# Patient Record
Sex: FEMALE | Race: WHITE | NOT HISPANIC OR LATINO | Employment: UNEMPLOYED
[De-identification: names, ages, dates, MRNs, and addresses within clinical notes are randomized per-mention and may not be internally consistent; named-entity substitution may affect disease eponyms.]

---

## 2017-01-03 ENCOUNTER — CHARTING TRANS (OUTPATIENT)
Dept: OTHER | Age: 62
End: 2017-01-03

## 2017-01-03 ENCOUNTER — IMAGING SERVICES (OUTPATIENT)
Dept: OTHER | Age: 62
End: 2017-01-03

## 2017-01-03 ENCOUNTER — HOSPITAL (OUTPATIENT)
Dept: OTHER | Age: 62
End: 2017-01-03
Attending: SURGERY

## 2017-01-30 ENCOUNTER — HOSPITAL ENCOUNTER (OUTPATIENT)
Dept: BONE DENSITY | Age: 62
Discharge: HOME | End: 2017-01-30
Attending: FAMILY MEDICINE
Payer: COMMERCIAL

## 2017-01-30 DIAGNOSIS — E03.9 ACQUIRED HYPOTHYROIDISM: ICD-10-CM

## 2017-01-30 DIAGNOSIS — Z78.0 MENOPAUSE: ICD-10-CM

## 2017-01-30 PROCEDURE — 77080 DXA BONE DENSITY AXIAL: CPT

## 2017-03-21 DIAGNOSIS — E03.9 ACQUIRED HYPOTHYROIDISM: Primary | ICD-10-CM

## 2017-03-21 RX ORDER — LEVOTHYROXINE SODIUM 137 UG/1
137 TABLET ORAL
Qty: 90 TABLET | Refills: 0 | Status: SHIPPED | OUTPATIENT
Start: 2017-03-21 | End: 2017-06-15

## 2017-05-08 NOTE — TELEPHONE ENCOUNTER
Refill request sent from pharmacy for Sertraline. LOV 12/05/16 and labs 04/14/16. Will you approve refill ? Routed to Dr Cinthya Pollack.

## 2017-05-18 DIAGNOSIS — E03.9 ACQUIRED HYPOTHYROIDISM: Primary | ICD-10-CM

## 2017-06-15 ENCOUNTER — OFFICE VISIT (OUTPATIENT)
Dept: FAMILY MEDICINE CLINIC | Facility: CLINIC | Age: 62
End: 2017-06-15

## 2017-06-15 VITALS
WEIGHT: 140.81 LBS | DIASTOLIC BLOOD PRESSURE: 70 MMHG | HEIGHT: 65.6 IN | BODY MASS INDEX: 22.9 KG/M2 | HEART RATE: 120 BPM | RESPIRATION RATE: 14 BRPM | SYSTOLIC BLOOD PRESSURE: 130 MMHG

## 2017-06-15 DIAGNOSIS — R03.0 WHITE COAT SYNDROME WITHOUT DIAGNOSIS OF HYPERTENSION: Primary | ICD-10-CM

## 2017-06-15 DIAGNOSIS — E03.9 ACQUIRED HYPOTHYROIDISM: ICD-10-CM

## 2017-06-15 DIAGNOSIS — F41.1 ANXIETY STATE: ICD-10-CM

## 2017-06-15 PROCEDURE — 99213 OFFICE O/P EST LOW 20 MIN: CPT | Performed by: FAMILY MEDICINE

## 2017-06-15 RX ORDER — LEVOTHYROXINE SODIUM 137 UG/1
137 TABLET ORAL
Qty: 90 TABLET | Refills: 3 | Status: SHIPPED | OUTPATIENT
Start: 2017-06-15 | End: 2017-12-08

## 2017-06-15 NOTE — ASSESSMENT & PLAN NOTE
Stable, Continue present management.     Thyroid  (most recent labs)     Lab Results  Component Value Date/Time   TSH 2.31 05/17/2017 04:04 PM   T4F 1.2 05/17/2017 04:04 PM         Endocrine Medications          Levothyroxine Sodium 137 MCG Oral Tab      Lo

## 2017-06-15 NOTE — PROGRESS NOTES
Patient presents with: Anxiety: follow up on Sertaline  Thyroid Problem: follow up on last months labs and med adjustent recently      HPI:   This is a 65year old female coming in for HTN fu. -130 /70 at home.    Taking sertraline 50 QOD, lots of st note and vitals reviewed. Constitutional: She is oriented to person, place, and time. She appears well-developed and well-nourished. No distress. HENT:   Head: Normocephalic. Neck: Normal range of motion. Neck supple.    Cardiovascular: Normal rate, r MD, 6/15/2017, 10:38 AM

## 2017-06-28 ENCOUNTER — OFFICE VISIT (OUTPATIENT)
Dept: FAMILY MEDICINE CLINIC | Facility: CLINIC | Age: 62
End: 2017-06-28

## 2017-06-28 ENCOUNTER — TELEPHONE (OUTPATIENT)
Dept: FAMILY MEDICINE CLINIC | Facility: CLINIC | Age: 62
End: 2017-06-28

## 2017-06-28 VITALS
TEMPERATURE: 98 F | HEIGHT: 65.75 IN | DIASTOLIC BLOOD PRESSURE: 75 MMHG | BODY MASS INDEX: 23.1 KG/M2 | SYSTOLIC BLOOD PRESSURE: 115 MMHG | WEIGHT: 142 LBS | HEART RATE: 96 BPM

## 2017-06-28 DIAGNOSIS — E03.9 ACQUIRED HYPOTHYROIDISM: ICD-10-CM

## 2017-06-28 DIAGNOSIS — R03.0 WHITE COAT SYNDROME WITHOUT DIAGNOSIS OF HYPERTENSION: ICD-10-CM

## 2017-06-28 DIAGNOSIS — S62.606A CLOSED DISPLACED FRACTURE OF PHALANX OF RIGHT LITTLE FINGER, UNSPECIFIED PHALANX, INITIAL ENCOUNTER: ICD-10-CM

## 2017-06-28 DIAGNOSIS — Z01.818 PREOP EXAMINATION: Primary | ICD-10-CM

## 2017-06-28 DIAGNOSIS — F41.1 ANXIETY STATE: ICD-10-CM

## 2017-06-28 PROCEDURE — 99243 OFF/OP CNSLTJ NEW/EST LOW 30: CPT | Performed by: FAMILY MEDICINE

## 2017-06-28 NOTE — PROGRESS NOTES
Patient presents with:  Pre-Op Exam: Hand surgery tomorrow, here for pre op clearance    HPI:   Marielos Trammell is a 65year old female who presents to the office for pre-operative evaluation. Was on a 21mile hike with friend, tripped and fell forward. organomegaly  Extremities: extremities normal, atraumatic, no cyanosis or edema. R hand and forearm wrapped in splint. Pulses: 2+ and symmetric  Neurologic: Alert and oriented X 3, normal strength and tone. Normal symmetric reflexes.  Normal coordination

## 2017-06-28 NOTE — TELEPHONE ENCOUNTER
Pt coming in today w/Dr Felipe Dates for Pre-Op R finger Fracture. Surgery on 6/29/17 w/Dr Vesta Rosario .. Clark Pencil EKG will be needed but done before appt today per Dr Tor Sidhu (offive to fax over letter)

## 2017-06-28 NOTE — TELEPHONE ENCOUNTER
Jeffery Ariza from [facility] [address] [facility] pt is there NOW has a Finger Fracture will be having surgery tomorrow and needs Pre-Op TODAY

## 2017-07-06 ENCOUNTER — HOSPITAL (OUTPATIENT)
Dept: OTHER | Age: 62
End: 2017-07-06
Attending: OBSTETRICS & GYNECOLOGY

## 2017-08-29 LAB
T4, FREE: 1.2 NG/DL (ref 0.8–1.8)
TSH: 2.79 MIU/L (ref 0.4–4.5)

## 2017-11-24 ENCOUNTER — TELEPHONE (OUTPATIENT)
Dept: FAMILY MEDICINE CLINIC | Facility: CLINIC | Age: 62
End: 2017-11-24

## 2017-11-24 DIAGNOSIS — E03.9 ACQUIRED HYPOTHYROIDISM: Primary | ICD-10-CM

## 2017-11-24 PROBLEM — S62.616A CLOSED DISPLACED FRACTURE OF PROXIMAL PHALANX OF RIGHT LITTLE FINGER: Status: ACTIVE | Noted: 2017-06-28

## 2017-11-24 NOTE — TELEPHONE ENCOUNTER
Called and talked to patient informed her of lab orders for thyroid she will get this done today or tomorrow

## 2017-11-24 NOTE — TELEPHONE ENCOUNTER
Patient is wanting to know if she can get an order to test her thyroid as she feels it's off right now.

## 2017-11-24 NOTE — TELEPHONE ENCOUNTER
Diagnoses and all orders for this visit:    Acquired hypothyroidism  -     ASSAY, THYROID STIM HORMONE  -     FREE T4 (FREE THYROXINE)  -     FREE T3 (TRIIODOTHYRONINE)         Ok to notify of non fasting lab at [facility]

## 2017-12-08 ENCOUNTER — OFFICE VISIT (OUTPATIENT)
Dept: FAMILY MEDICINE CLINIC | Facility: CLINIC | Age: 62
End: 2017-12-08

## 2017-12-08 VITALS
HEIGHT: 65 IN | RESPIRATION RATE: 16 BRPM | DIASTOLIC BLOOD PRESSURE: 76 MMHG | HEART RATE: 84 BPM | TEMPERATURE: 97 F | SYSTOLIC BLOOD PRESSURE: 130 MMHG | BODY MASS INDEX: 23.66 KG/M2 | WEIGHT: 142 LBS

## 2017-12-08 DIAGNOSIS — R03.0 WHITE COAT SYNDROME WITHOUT DIAGNOSIS OF HYPERTENSION: ICD-10-CM

## 2017-12-08 DIAGNOSIS — Z00.00 ANNUAL PHYSICAL EXAM: Primary | ICD-10-CM

## 2017-12-08 DIAGNOSIS — E03.9 ACQUIRED HYPOTHYROIDISM: ICD-10-CM

## 2017-12-08 PROCEDURE — 99396 PREV VISIT EST AGE 40-64: CPT | Performed by: FAMILY MEDICINE

## 2017-12-08 RX ORDER — LEVOTHYROXINE SODIUM 0.12 MG/1
125 TABLET ORAL
Qty: 90 TABLET | Refills: 3 | Status: SHIPPED | OUTPATIENT
Start: 2017-12-08 | End: 2018-07-26 | Stop reason: DRUGHIGH

## 2017-12-08 NOTE — PROGRESS NOTES
Braxton Naylor is a 65year old female who presents for a complete physical exam.   HPI:   Patient presents with:  Physical: Pt has own OBGYN     Her last annual assessment has been over 1 year: Annual Physical due on 12/05/2017          Symptoms: is me AM       No results found for: A1C, VITD       Last Valarie category :Jose Elias Gins due on 12/03/2016   [unfilled]  No results found.         Current Outpatient Prescriptions on File Prior to Visit:  SERTRALINE HCL 50 MG Oral Tab TAKE ONE TABLET BY MOUTH DA sore throat or dental problems  HEART:  No chest pain or palpitations  LUNG:  No SOB, cough or wheeze  GI:  No abdominal pain.   No N/V/D/C  :  No dysuria  MS:  No joint pain or swelling  NEURO:  Denies numbness or tingling  PSYCH:  No mood concerns or an She has no cervical adenopathy. She has no axillary adenopathy. Neurological: She is alert and oriented to person, place, and time. She has normal strength and normal reflexes. No cranial nerve deficit or sensory deficit.    Skin: Skin is warm,  home, will follow.  Labs in 6 months and cut to 125 levothyroxine  Return in about 1 year (around 12/8/2018) for Annual physical.    Jeana Jones MD, 12/8/2017, 8:22 AM

## 2018-01-20 LAB
T4, FREE: 1.4 NG/DL (ref 0.8–1.8)
TSH: 1.15 MIU/L (ref 0.4–4.5)

## 2018-01-24 DIAGNOSIS — Z00.00 LABORATORY EXAM ORDERED AS PART OF ROUTINE GENERAL MEDICAL EXAMINATION: ICD-10-CM

## 2018-01-24 DIAGNOSIS — E03.9 ACQUIRED HYPOTHYROIDISM: Primary | ICD-10-CM

## 2018-05-09 ENCOUNTER — TELEPHONE (OUTPATIENT)
Dept: FAMILY MEDICINE CLINIC | Facility: CLINIC | Age: 63
End: 2018-05-09

## 2018-05-09 NOTE — TELEPHONE ENCOUNTER
Patient experiencing on and off heart palpitations. Patient would like to know if this could be related to thyroid levels. If so, patient requesting orders for Thyroid.

## 2018-05-30 ENCOUNTER — TELEPHONE (OUTPATIENT)
Dept: FAMILY MEDICINE CLINIC | Facility: CLINIC | Age: 63
End: 2018-05-30

## 2018-05-30 DIAGNOSIS — E03.9 ACQUIRED HYPOTHYROIDISM: Primary | ICD-10-CM

## 2018-05-30 RX ORDER — LEVOTHYROXINE SODIUM 112 UG/1
112 TABLET ORAL
Qty: 90 TABLET | Refills: 0 | Status: SHIPPED | OUTPATIENT
Start: 2018-05-30 | End: 2018-08-22

## 2018-05-30 NOTE — TELEPHONE ENCOUNTER
Rx for Levothyroxine sent to [facility]. Lab orders sent to [facility].    Left message on answering machine to call triage.

## 2018-05-30 NOTE — TELEPHONE ENCOUNTER
Notified pt that Rx for Levothyroxine 112 mcg was sent to [place]. She agrees to recheck thyroid levels in 2 months.

## 2018-05-30 NOTE — TELEPHONE ENCOUNTER
Ok for 112 mcg #90 from current 125, repeat labs (TSH and FT4) in 2 months.  Thanks, Vinayak Dueñas MD

## 2018-05-30 NOTE — TELEPHONE ENCOUNTER
Called and talked to patient gave results and recommendations she feels she is too low and would like to decrease her levothyroxine  to increase her TSH she is currently taking 125 mcg daily I will ask Dr Felix Jimenez to consider this

## 2018-07-16 ENCOUNTER — HOSPITAL (OUTPATIENT)
Dept: OTHER | Age: 63
End: 2018-07-16
Attending: OBSTETRICS & GYNECOLOGY

## 2018-08-17 LAB
ABSOLUTE BASOPHILS: 60 CELLS/UL (ref 0–200)
ABSOLUTE EOSINOPHILS: 152 CELLS/UL (ref 15–500)
ABSOLUTE LYMPHOCYTES: 1325 CELLS/UL (ref 850–3900)
ABSOLUTE MONOCYTES: 400 CELLS/UL (ref 200–950)
ABSOLUTE NEUTROPHILS: 2663 CELLS/UL (ref 1500–7800)
ALBUMIN/GLOBULIN RATIO: 1.4 (CALC) (ref 1–2.5)
ALBUMIN: 4.5 G/DL (ref 3.6–5.1)
ALKALINE PHOSPHATASE: 77 U/L (ref 33–130)
ALT: 20 U/L (ref 6–29)
AST: 34 U/L (ref 10–35)
BASOPHILS: 1.3 %
BILIRUBIN, TOTAL: 1.4 MG/DL (ref 0.2–1.2)
BUN: 20 MG/DL (ref 7–25)
CALCIUM: 9.6 MG/DL (ref 8.6–10.4)
CARBON DIOXIDE: 26 MMOL/L (ref 20–32)
CHLORIDE: 103 MMOL/L (ref 98–110)
CHOL/HDLC RATIO: 2.5 (CALC)
CHOLESTEROL, TOTAL: 192 MG/DL
CREATININE: 0.84 MG/DL (ref 0.5–0.99)
EGFR IF AFRICN AM: 86 ML/MIN/1.73M2
EGFR IF NONAFRICN AM: 74 ML/MIN/1.73M2
EOSINOPHILS: 3.3 %
GLOBULIN: 3.3 G/DL (CALC) (ref 1.9–3.7)
GLUCOSE: 93 MG/DL (ref 65–99)
HDL CHOLESTEROL: 77 MG/DL
HEMATOCRIT: 43.8 % (ref 35–45)
HEMOGLOBIN: 14.5 G/DL (ref 11.7–15.5)
LDL-CHOLESTEROL: 100 MG/DL (CALC)
LYMPHOCYTES: 28.8 %
MCH: 29.9 PG (ref 27–33)
MCHC: 33.1 G/DL (ref 32–36)
MCV: 90.3 FL (ref 80–100)
MONOCYTES: 8.7 %
MPV: 11.3 FL (ref 7.5–12.5)
NEUTROPHILS: 57.9 %
NON-HDL CHOLESTEROL: 115 MG/DL (CALC)
PLATELET COUNT: 249 THOUSAND/UL (ref 140–400)
POTASSIUM: 4.1 MMOL/L (ref 3.5–5.3)
PROTEIN, TOTAL: 7.8 G/DL (ref 6.1–8.1)
RDW: 12.6 % (ref 11–15)
RED BLOOD CELL COUNT: 4.85 MILLION/UL (ref 3.8–5.1)
SODIUM: 138 MMOL/L (ref 135–146)
T4, FREE: 1.5 NG/DL (ref 0.8–1.8)
TRIGLYCERIDES: 61 MG/DL
TSH: 3.18 MIU/L (ref 0.4–4.5)
VITAMIN D, 25-OH, TOTAL: 28 NG/ML (ref 30–100)
WHITE BLOOD CELL COUNT: 4.6 THOUSAND/UL (ref 3.8–10.8)

## 2018-08-22 DIAGNOSIS — E03.9 ACQUIRED HYPOTHYROIDISM: ICD-10-CM

## 2018-08-25 RX ORDER — LEVOTHYROXINE SODIUM 112 UG/1
TABLET ORAL
Qty: 90 TABLET | Refills: 0 | Status: SHIPPED | OUTPATIENT
Start: 2018-08-25 | End: 2018-11-25

## 2018-08-25 NOTE — TELEPHONE ENCOUNTER
Pending Prescriptions Disp Refills    LEVOTHYROXINE SODIUM 112 MCG Oral Tab [Pharmacy Med Name: Levothyroxine Sodium Oral Tablet 112 MCG] 90 tablet 0     Sig: TAKE ONE TABLET BY MOUTH ONE TIME DAILY before breakfast           LOV Visit date not found

## 2018-09-06 ENCOUNTER — OFFICE VISIT (OUTPATIENT)
Dept: FAMILY MEDICINE CLINIC | Facility: CLINIC | Age: 63
End: 2018-09-06
Payer: COMMERCIAL

## 2018-09-06 VITALS
BODY MASS INDEX: 22.93 KG/M2 | RESPIRATION RATE: 16 BRPM | WEIGHT: 141 LBS | HEIGHT: 65.75 IN | HEART RATE: 76 BPM | SYSTOLIC BLOOD PRESSURE: 120 MMHG | DIASTOLIC BLOOD PRESSURE: 70 MMHG | TEMPERATURE: 97 F

## 2018-09-06 DIAGNOSIS — R03.0 WHITE COAT SYNDROME WITHOUT DIAGNOSIS OF HYPERTENSION: ICD-10-CM

## 2018-09-06 DIAGNOSIS — E03.9 ACQUIRED HYPOTHYROIDISM: ICD-10-CM

## 2018-09-06 DIAGNOSIS — Z00.00 ANNUAL PHYSICAL EXAM: Primary | ICD-10-CM

## 2018-09-06 PROCEDURE — 99396 PREV VISIT EST AGE 40-64: CPT | Performed by: FAMILY MEDICINE

## 2018-09-06 PROCEDURE — 90632 HEPA VACCINE ADULT IM: CPT | Performed by: FAMILY MEDICINE

## 2018-09-06 PROCEDURE — 90471 IMMUNIZATION ADMIN: CPT | Performed by: FAMILY MEDICINE

## 2018-09-06 NOTE — PROGRESS NOTES
Linda Davis is a 59year old female who presents for a complete physical exam.   HPI:   Patient presents with:  Physical  Testing: Pt states had mammogram at [facility] and will get us a copy     Annual Physical due on 12/08/2018    going to [facility] TRIG 61 08/16/2018 09:17 AM    (H) 08/16/2018 09:17 AM   NONHDLC 115 08/16/2018 09:17 AM       No results found for: A1C, VITD       Last Valarie category :Mammogram due on 12/03/2016   [unfilled]  No results found.         Current Outpatient Prescr hearing loss  MOUTH/THROAT:  No sore throat or dental problems  HEART:  No chest pain or palpitations  LUNG:  No SOB, cough or wheeze  GI:  No abdominal pain.   No N/V/D/C  :  No dysuria  MS:  No joint pain or swelling  NEURO:  Denies numbness or tingling

## 2018-09-06 NOTE — PATIENT INSTRUCTIONS
No visits with results within 1 Month(s) from this visit.    Latest known visit with results is:   Telephone on 05/30/2018   Component Date Value Ref Range Status   • TSH 08/16/2018 3.18  0.40 - 4.50 mIU/L Final   • T4, FREE 08/16/2018 1.5  0.8 - 1.8 ng/dL

## 2018-11-15 ENCOUNTER — TELEPHONE (OUTPATIENT)
Dept: FAMILY MEDICINE CLINIC | Facility: CLINIC | Age: 63
End: 2018-11-15

## 2018-11-15 NOTE — TELEPHONE ENCOUNTER
Form just needs update and signature from Dr. Guilherme Swanson placed in your in-basket, Routed to Dr. Cinthya Pollack

## 2018-11-15 NOTE — TELEPHONE ENCOUNTER
Patient dropped off form she needs completed for employer to show she had a physical this year and that she's ok to work. Patient would like to  once completed.  Form in triage

## 2018-11-15 NOTE — TELEPHONE ENCOUNTER
Form requests PPD. Patient will check with employer to see if this is the necessary.      Form in folder in triage

## 2018-11-25 DIAGNOSIS — E03.9 ACQUIRED HYPOTHYROIDISM: ICD-10-CM

## 2018-11-26 RX ORDER — LEVOTHYROXINE SODIUM 112 UG/1
TABLET ORAL
Qty: 90 TABLET | Refills: 0 | Status: SHIPPED | OUTPATIENT
Start: 2018-11-26 | End: 2018-11-26

## 2018-11-26 RX ORDER — LEVOTHYROXINE SODIUM 112 UG/1
TABLET ORAL
Qty: 90 TABLET | Refills: 2 | Status: SHIPPED | OUTPATIENT
Start: 2018-11-26 | End: 2019-11-20

## 2018-11-26 NOTE — TELEPHONE ENCOUNTER
Requested Prescriptions     Pending Prescriptions Disp Refills   • LEVOTHYROXINE SODIUM 112 MCG Oral Tab [Pharmacy Med Name: Levothyroxine Sodium Oral Tablet 112 MCG] 90 tablet 0     Sig: TAKE ONE TABLET BY MOUTH ONE TIME DAILY BEFORE BREAKFAST       LOV 9

## 2018-12-17 ENCOUNTER — OFFICE VISIT (OUTPATIENT)
Dept: FAMILY MEDICINE CLINIC | Facility: CLINIC | Age: 63
End: 2018-12-17
Payer: COMMERCIAL

## 2018-12-17 VITALS
TEMPERATURE: 98 F | HEIGHT: 64.75 IN | BODY MASS INDEX: 24.12 KG/M2 | DIASTOLIC BLOOD PRESSURE: 88 MMHG | RESPIRATION RATE: 20 BRPM | WEIGHT: 143 LBS | SYSTOLIC BLOOD PRESSURE: 138 MMHG | HEART RATE: 96 BPM

## 2018-12-17 DIAGNOSIS — K52.9 GASTROENTERITIS PRESUMED INFECTIOUS: ICD-10-CM

## 2018-12-17 DIAGNOSIS — R14.0 ABDOMINAL BLOATING: Primary | ICD-10-CM

## 2018-12-17 PROCEDURE — 99213 OFFICE O/P EST LOW 20 MIN: CPT | Performed by: FAMILY MEDICINE

## 2018-12-17 NOTE — PROGRESS NOTES
Patient presents with:  Bloating: on/off x 3 weeks  Gas: on/off x 3 weeks      Subjective   HPI:   This is a 59year old female coming in for 3 weeks of bloating, gas and not normal. White coat HTN hx and HR and BP up today because of worry about this.  Sta time. She appears well-developed and well-nourished. No distress. HENT:   Head: Normocephalic. Neck: Normal range of motion. Neck supple. Cardiovascular: Normal rate, regular rhythm and normal heart sounds. No murmur heard.   Pulmonary/Chest: Effor

## 2019-01-21 ENCOUNTER — TELEPHONE (OUTPATIENT)
Dept: FAMILY MEDICINE CLINIC | Facility: CLINIC | Age: 64
End: 2019-01-21

## 2019-01-21 DIAGNOSIS — E03.9 ACQUIRED HYPOTHYROIDISM: Primary | ICD-10-CM

## 2019-01-21 NOTE — TELEPHONE ENCOUNTER
Just had normal TSH/T4 8/16/2018 requesting to redo labs will ask Dr Kenny Morales about this it might need to wait until 1 year

## 2019-02-12 ENCOUNTER — TELEPHONE (OUTPATIENT)
Dept: FAMILY MEDICINE CLINIC | Facility: CLINIC | Age: 64
End: 2019-02-12

## 2019-02-12 RX ORDER — CIPROFLOXACIN 500 MG/1
500 TABLET, FILM COATED ORAL 2 TIMES DAILY
Qty: 20 TABLET | Refills: 0 | Status: SHIPPED | OUTPATIENT
Start: 2019-02-12 | End: 2019-02-22

## 2019-02-12 NOTE — TELEPHONE ENCOUNTER
Please notify:presctiptions sent    Requested Prescriptions     Signed Prescriptions Disp Refills   • Typhoid Vaccine Oral Capsule Delayed Release 4 capsule 0     Sig: Take 1 capsule by mouth every other day for 7 days.      Authorizing Provider: Keyon Reyna

## 2019-02-12 NOTE — TELEPHONE ENCOUNTER
Pt needs typhoid pills and a antibiotic Rx because the are traveling to [facility] and they talked to Dr Felix Jimenez about this they are wondering when they should get this medicine and they are leaving March 11th 2019

## 2019-02-19 LAB
T4, FREE: 1.2 NG/DL (ref 0.8–1.8)
TSH: 2.7 MIU/L (ref 0.4–4.5)

## 2019-02-25 ENCOUNTER — TELEPHONE (OUTPATIENT)
Dept: FAMILY MEDICINE CLINIC | Facility: CLINIC | Age: 64
End: 2019-02-25

## 2019-02-25 NOTE — TELEPHONE ENCOUNTER
TC to patient she has appt for second Hepatitis A shot however too early to receive should receive after 3/6/2018. Left message for patient to call our office back.

## 2019-03-08 ENCOUNTER — NURSE ONLY (OUTPATIENT)
Dept: FAMILY MEDICINE CLINIC | Facility: CLINIC | Age: 64
End: 2019-03-08
Payer: COMMERCIAL

## 2019-03-08 DIAGNOSIS — Z23 NEED FOR HEPATITIS A VACCINATION: Primary | ICD-10-CM

## 2019-03-08 PROCEDURE — 90632 HEPA VACCINE ADULT IM: CPT | Performed by: FAMILY MEDICINE

## 2019-03-08 PROCEDURE — 90471 IMMUNIZATION ADMIN: CPT | Performed by: FAMILY MEDICINE

## 2019-05-09 ENCOUNTER — TELEPHONE (OUTPATIENT)
Dept: FAMILY MEDICINE CLINIC | Facility: CLINIC | Age: 64
End: 2019-05-09

## 2019-05-09 DIAGNOSIS — Z12.31 VISIT FOR SCREENING MAMMOGRAM: ICD-10-CM

## 2019-05-09 DIAGNOSIS — Z11.59 ENCOUNTER FOR HEPATITIS C SCREENING TEST FOR LOW RISK PATIENT: ICD-10-CM

## 2019-05-09 DIAGNOSIS — Z00.00 LABORATORY EXAMINATION ORDERED AS PART OF A ROUTINE GENERAL MEDICAL EXAMINATION: ICD-10-CM

## 2019-05-09 NOTE — TELEPHONE ENCOUNTER
Please enter lab orders for the patient's upcoming physical appointment. Physical scheduled:    Your appointments     Date & Time Appointment Department [facility])    Aug 29, 2019  8:00 AM CDT Physical - Established Patient with MD Ayanna Geronimo

## 2019-07-18 ENCOUNTER — HOSPITAL (OUTPATIENT)
Dept: OTHER | Age: 64
End: 2019-07-18
Attending: OBSTETRICS & GYNECOLOGY

## 2019-08-22 LAB
ALBUMIN/GLOBULIN RATIO: 1.4 (CALC) (ref 1–2.5)
ALBUMIN: 4.4 G/DL (ref 3.6–5.1)
ALKALINE PHOSPHATASE: 73 U/L (ref 33–130)
ALT: 17 U/L (ref 6–29)
AST: 27 U/L (ref 10–35)
BILIRUBIN, TOTAL: 1 MG/DL (ref 0.2–1.2)
BUN: 14 MG/DL (ref 7–25)
CALCIUM: 9.6 MG/DL (ref 8.6–10.4)
CARBON DIOXIDE: 27 MMOL/L (ref 20–32)
CHLORIDE: 105 MMOL/L (ref 98–110)
CHOL/HDLC RATIO: 2.6 (CALC)
CHOLESTEROL, TOTAL: 176 MG/DL
CREATININE: 0.79 MG/DL (ref 0.5–0.99)
EGFR IF AFRICN AM: 92 ML/MIN/1.73M2
EGFR IF NONAFRICN AM: 80 ML/MIN/1.73M2
GLOBULIN: 3.2 G/DL (CALC) (ref 1.9–3.7)
GLUCOSE: 91 MG/DL (ref 65–99)
HDL CHOLESTEROL: 68 MG/DL
HEMATOCRIT: 41.2 % (ref 35–45)
HEMOGLOBIN: 13.7 G/DL (ref 11.7–15.5)
LDL-CHOLESTEROL: 93 MG/DL (CALC)
MCH: 29.7 PG (ref 27–33)
MCHC: 33.3 G/DL (ref 32–36)
MCV: 89.4 FL (ref 80–100)
MPV: 11.1 FL (ref 7.5–12.5)
NON-HDL CHOLESTEROL: 108 MG/DL (CALC)
PLATELET COUNT: 272 THOUSAND/UL (ref 140–400)
POTASSIUM: 4.5 MMOL/L (ref 3.5–5.3)
PROTEIN, TOTAL: 7.6 G/DL (ref 6.1–8.1)
RDW: 12.3 % (ref 11–15)
RED BLOOD CELL COUNT: 4.61 MILLION/UL (ref 3.8–5.1)
SIGNAL TO CUT-OFF: 0.06
SODIUM: 140 MMOL/L (ref 135–146)
T4, FREE: 1.2 NG/DL (ref 0.8–1.8)
TRIGLYCERIDES: 63 MG/DL
TSH W/REFLEX TO FT4: 6.71 MIU/L (ref 0.4–4.5)
WHITE BLOOD CELL COUNT: 4.9 THOUSAND/UL (ref 3.8–10.8)

## 2019-08-23 DIAGNOSIS — E03.9 ACQUIRED HYPOTHYROIDISM: Primary | ICD-10-CM

## 2019-08-23 RX ORDER — LEVOTHYROXINE SODIUM 0.12 MG/1
125 TABLET ORAL
Qty: 90 TABLET | Refills: 0 | Status: SHIPPED | OUTPATIENT
Start: 2019-08-23 | End: 2019-08-29

## 2019-08-23 NOTE — TELEPHONE ENCOUNTER
Patient notified of results and recommendations. Patient verbalized understanding and agrees with plan. Rx for Levothyroxine sent to [facility].

## 2019-08-29 ENCOUNTER — OFFICE VISIT (OUTPATIENT)
Dept: FAMILY MEDICINE CLINIC | Facility: CLINIC | Age: 64
End: 2019-08-29
Payer: COMMERCIAL

## 2019-08-29 VITALS
TEMPERATURE: 98 F | BODY MASS INDEX: 23.79 KG/M2 | HEART RATE: 80 BPM | RESPIRATION RATE: 14 BRPM | WEIGHT: 142.81 LBS | SYSTOLIC BLOOD PRESSURE: 136 MMHG | DIASTOLIC BLOOD PRESSURE: 78 MMHG | HEIGHT: 65 IN

## 2019-08-29 DIAGNOSIS — E03.9 ACQUIRED HYPOTHYROIDISM: ICD-10-CM

## 2019-08-29 DIAGNOSIS — Z00.00 ANNUAL PHYSICAL EXAM: Primary | ICD-10-CM

## 2019-08-29 PROCEDURE — 99396 PREV VISIT EST AGE 40-64: CPT | Performed by: FAMILY MEDICINE

## 2019-08-29 NOTE — ASSESSMENT & PLAN NOTE
Stable, Continue present management.     Thyroid  (most recent labs)   Lab Results   Component Value Date/Time    TSH 2.70 02/18/2019 01:51 PM    T4F 1.2 08/21/2019 07:14 AM    TSHT4 6.71 (H) 08/21/2019 07:14 AM         Endocrine Medications          Levoth

## 2019-10-17 ENCOUNTER — TELEPHONE (OUTPATIENT)
Dept: FAMILY MEDICINE CLINIC | Facility: CLINIC | Age: 64
End: 2019-10-17

## 2019-10-17 NOTE — TELEPHONE ENCOUNTER
Called patient and let her know form was ready for . Copy made for scan and tickler.  Original placed in drawer, patient states she will be in today to

## 2019-10-17 NOTE — TELEPHONE ENCOUNTER
Patient needs wellness form filled out with signature and license number. Patient stated is forms aren't completed by tomorrow at least she will owe $3000 dollars. She would like to be contacted at [phone] when completed.

## 2019-11-20 DIAGNOSIS — E03.9 ACQUIRED HYPOTHYROIDISM: ICD-10-CM

## 2019-11-20 RX ORDER — LEVOTHYROXINE SODIUM 112 UG/1
TABLET ORAL
Qty: 90 TABLET | Refills: 0 | Status: SHIPPED | OUTPATIENT
Start: 2019-11-20 | End: 2020-02-17

## 2019-11-20 RX ORDER — LEVOTHYROXINE SODIUM 0.12 MG/1
125 TABLET ORAL
Qty: 90 TABLET | Refills: 0 | OUTPATIENT
Start: 2019-11-20

## 2019-11-20 NOTE — TELEPHONE ENCOUNTER
Received refill request from [place] for Levothyroxine 125 mcg. Spoke with Sonia Culver. She is taking Levothyroxine 112 mcg and does not need a refill at this time. This request was denied.

## 2019-11-20 NOTE — TELEPHONE ENCOUNTER
Requested Prescriptions     Pending Prescriptions Disp Refills   • LEVOTHYROXINE SODIUM 112 MCG Oral Tab [Pharmacy Med Name: Levothyroxine Sodium 112 Mcg Tab Sand] 90 tablet 0     Sig: TAKE ONE TABLET BY MOUTH ONE TIME DAILY BEFORE BREAKFAST     LOV 8/29/2

## 2019-12-10 DIAGNOSIS — E03.9 ACQUIRED HYPOTHYROIDISM: ICD-10-CM

## 2019-12-10 RX ORDER — LEVOTHYROXINE SODIUM 112 UG/1
TABLET ORAL
Qty: 30 TABLET | Refills: 0 | Status: SHIPPED | OUTPATIENT
Start: 2019-12-10 | End: 2020-03-16

## 2020-01-03 LAB
T4, FREE: 1.3 NG/DL (ref 0.8–1.8)
TSH: 2.9 MIU/L (ref 0.4–4.5)

## 2020-01-03 NOTE — PROGRESS NOTES
Discussed TSH results with Tata by phone telling her it was at a normal level and to continue current management per Junior Camara. Tata verbalized understanding.

## 2020-02-17 DIAGNOSIS — E03.9 ACQUIRED HYPOTHYROIDISM: ICD-10-CM

## 2020-02-17 RX ORDER — LEVOTHYROXINE SODIUM 112 UG/1
TABLET ORAL
Qty: 90 TABLET | Refills: 0 | Status: SHIPPED | OUTPATIENT
Start: 2020-02-17 | End: 2020-06-17

## 2020-02-17 NOTE — TELEPHONE ENCOUNTER
Requested Prescriptions     Pending Prescriptions Disp Refills   • LEVOTHYROXINE SODIUM 112 MCG Oral Tab [Pharmacy Med Name: Levothyroxine Sodium 112 Mcg Tab Sand] 90 tablet 0     Sig: TAKE ONE TABLET BY MOUTH ONE TIME DAILY BEFORE BREAKFAST.      LOV 8/29/

## 2020-03-01 ENCOUNTER — PRIOR ORIGINAL RECORDS (OUTPATIENT)
Dept: HEALTH INFORMATION MANAGEMENT | Facility: OTHER | Age: 65
End: 2020-03-01

## 2020-03-02 PROBLEM — Z86.0100 PERSONAL HISTORY OF COLONIC POLYPS: Status: ACTIVE | Noted: 2020-03-02

## 2020-03-02 PROBLEM — Z86.010 PERSONAL HISTORY OF COLONIC POLYPS: Status: ACTIVE | Noted: 2020-03-02

## 2020-03-02 PROBLEM — K64.8 OTHER HEMORRHOIDS: Status: ACTIVE | Noted: 2020-03-02

## 2020-03-15 DIAGNOSIS — E03.9 ACQUIRED HYPOTHYROIDISM: ICD-10-CM

## 2020-03-16 RX ORDER — LEVOTHYROXINE SODIUM 112 UG/1
TABLET ORAL
Qty: 30 TABLET | Refills: 0 | OUTPATIENT
Start: 2020-03-16

## 2020-03-16 NOTE — TELEPHONE ENCOUNTER
Requested Prescriptions     Refused Prescriptions Disp Refills   • LEVOTHYROXINE SODIUM 112 MCG Oral Tab [Pharmacy Med Name: Levothyroxine Sodium 112 Mcg Tab Sand] 30 tablet 0     Sig: TAKE ONE TABLET BY MOUTH ONE TIME DAILY BEFORE BREAKFAST     Refused By

## 2020-04-15 ENCOUNTER — TELEPHONE (OUTPATIENT)
Dept: FAMILY MEDICINE CLINIC | Facility: CLINIC | Age: 65
End: 2020-04-15

## 2020-04-15 DIAGNOSIS — E03.9 ACQUIRED HYPOTHYROIDISM: Primary | ICD-10-CM

## 2020-04-15 NOTE — TELEPHONE ENCOUNTER
Patient called would like to know if new order for thyroid can be enter for her to have done at [facility]. Patient stated pharmacist recommended she checks her thyroid levels because pharmacy needs to change brand of thyroid medication. Please contact patient.

## 2020-04-15 NOTE — TELEPHONE ENCOUNTER
Agree with lab check and orders signed. Thanks for pending. Should complete 8 weeks after starting new med (6 weeks is too soon). Thanks.

## 2020-04-15 NOTE — TELEPHONE ENCOUNTER
Yohan Haddad picked up her thyroid medication from the pharmacy 3 weeks and was cautioned by the pharmacist, because it was a different brand of levothyroxine, that it would be a good idea to have a repeat thyroid test in about 6 weeks to make sure the dose was

## 2020-05-05 ENCOUNTER — APPOINTMENT (OUTPATIENT)
Dept: OBGYN | Age: 65
End: 2020-05-05

## 2020-05-21 ENCOUNTER — TELEPHONE (OUTPATIENT)
Dept: FAMILY MEDICINE CLINIC | Facility: CLINIC | Age: 65
End: 2020-05-21

## 2020-05-21 DIAGNOSIS — Z12.31 VISIT FOR SCREENING MAMMOGRAM: ICD-10-CM

## 2020-05-21 DIAGNOSIS — Z00.00 LABORATORY EXAMINATION ORDERED AS PART OF A ROUTINE GENERAL MEDICAL EXAMINATION: ICD-10-CM

## 2020-05-21 DIAGNOSIS — E03.9 ACQUIRED HYPOTHYROIDISM: Primary | ICD-10-CM

## 2020-05-21 NOTE — TELEPHONE ENCOUNTER
Please enter lab orders for the patient's upcoming physical appointment. Physical scheduled:    Your appointments     Date & Time Appointment Department [facility])    Aug 21, 2020  2:15 PM CDT Physical - Established with Shubham Lucas MD [facility]

## 2020-05-21 NOTE — TELEPHONE ENCOUNTER
Diagnoses and all orders for this visit:    Acquired hypothyroidism  -     ASSAY, THYROID STIM HORMONE  -     FREE T4 (FREE THYROXINE)    Visit for screening mammogram  -     [facility] SCREENING BILAT (CPT=77067);  Future    Laboratory examination ordered as part

## 2020-05-26 ENCOUNTER — TELEPHONE (OUTPATIENT)
Dept: FAMILY MEDICINE CLINIC | Facility: CLINIC | Age: 65
End: 2020-05-26

## 2020-05-26 DIAGNOSIS — Z20.822 EXPOSURE TO COVID-19 VIRUS: Primary | ICD-10-CM

## 2020-05-26 NOTE — TELEPHONE ENCOUNTER
[employer] message sent by  poncho CONTEH, will order covid ab, but explained low likelihood.  Thanks and stay well, Minoo Montgomery MD  traveld to [place] [id number]

## 2020-06-17 DIAGNOSIS — E03.9 ACQUIRED HYPOTHYROIDISM: ICD-10-CM

## 2020-06-17 RX ORDER — LEVOTHYROXINE SODIUM 112 UG/1
TABLET ORAL
Qty: 90 TABLET | Refills: 0 | Status: SHIPPED | OUTPATIENT
Start: 2020-06-17 | End: 2020-08-21

## 2020-06-17 NOTE — TELEPHONE ENCOUNTER
Requested Prescriptions     Pending Prescriptions Disp Refills   • LEVOTHYROXINE SODIUM 112 MCG Oral Tab [Pharmacy Med Name: Levothyroxine Sodium 112 Mcg Tab Lupi] 90 tablet 0     Sig: TAKE ONE TABLET BY MOUTH ONE TIME DAILY BEFORE BREAKFAST.      LOV 8/29/

## 2020-07-13 ENCOUNTER — HOSPITAL ENCOUNTER (OUTPATIENT)
Dept: MAMMOGRAPHY | Age: 65
Discharge: HOME | End: 2020-07-13
Attending: OBSTETRICS & GYNECOLOGY

## 2020-07-13 DIAGNOSIS — Z12.31 BREAST CANCER SCREENING BY MAMMOGRAM: ICD-10-CM

## 2020-07-13 PROCEDURE — 77067 SCR MAMMO BI INCL CAD: CPT

## 2020-08-04 DIAGNOSIS — F41.1 ANXIETY STATE: ICD-10-CM

## 2020-08-05 NOTE — TELEPHONE ENCOUNTER
Refill request for:    Requested Prescriptions     Pending Prescriptions Disp Refills   • SERTRALINE HCL 50 MG Oral Tab [Pharmacy Med Name: Sertraline Hcl 50 Mg Tab Nort] 90 tablet 0     Sig: TAKE ONE TABLET BY MOUTH DAILY        Last Prescribed Quantity R

## 2020-08-15 LAB
ALBUMIN/GLOBULIN RATIO: 1.4 (CALC) (ref 1–2.5)
ALBUMIN: 4.4 G/DL (ref 3.6–5.1)
ALKALINE PHOSPHATASE: 77 U/L (ref 37–153)
ALT: 15 U/L (ref 6–29)
AST: 24 U/L (ref 10–35)
BILIRUBIN, TOTAL: 1 MG/DL (ref 0.2–1.2)
BUN: 18 MG/DL (ref 7–25)
CALCIUM: 9.7 MG/DL (ref 8.6–10.4)
CARBON DIOXIDE: 27 MMOL/L (ref 20–32)
CHLORIDE: 104 MMOL/L (ref 98–110)
CHOL/HDLC RATIO: 2.4 (CALC)
CHOLESTEROL, TOTAL: 170 MG/DL
CREATININE: 0.8 MG/DL (ref 0.5–0.99)
EGFR IF AFRICN AM: 90 ML/MIN/1.73M2
EGFR IF NONAFRICN AM: 78 ML/MIN/1.73M2
GLOBULIN: 3.1 G/DL (CALC) (ref 1.9–3.7)
GLUCOSE: 87 MG/DL (ref 65–99)
HDL CHOLESTEROL: 71 MG/DL
HEMATOCRIT: 42.5 % (ref 35–45)
HEMOGLOBIN: 13.8 G/DL (ref 11.7–15.5)
LDL-CHOLESTEROL: 86 MG/DL (CALC)
MCH: 29.6 PG (ref 27–33)
MCHC: 32.5 G/DL (ref 32–36)
MCV: 91 FL (ref 80–100)
MPV: 11.7 FL (ref 7.5–12.5)
NON-HDL CHOLESTEROL: 99 MG/DL (CALC)
PLATELET COUNT: 205 THOUSAND/UL (ref 140–400)
POTASSIUM: 4.2 MMOL/L (ref 3.5–5.3)
PROTEIN, TOTAL: 7.5 G/DL (ref 6.1–8.1)
RDW: 12.8 % (ref 11–15)
RED BLOOD CELL COUNT: 4.67 MILLION/UL (ref 3.8–5.1)
SODIUM: 139 MMOL/L (ref 135–146)
T4, FREE: 1.3 NG/DL (ref 0.8–1.8)
TRIGLYCERIDES: 54 MG/DL
TSH: 2.35 MIU/L (ref 0.4–4.5)
WHITE BLOOD CELL COUNT: 5.1 THOUSAND/UL (ref 3.8–10.8)

## 2020-08-21 ENCOUNTER — OFFICE VISIT (OUTPATIENT)
Dept: FAMILY MEDICINE CLINIC | Facility: CLINIC | Age: 65
End: 2020-08-21
Payer: COMMERCIAL

## 2020-08-21 VITALS
BODY MASS INDEX: 23.76 KG/M2 | SYSTOLIC BLOOD PRESSURE: 138 MMHG | HEIGHT: 65 IN | DIASTOLIC BLOOD PRESSURE: 88 MMHG | HEART RATE: 115 BPM | TEMPERATURE: 98 F | WEIGHT: 142.63 LBS | RESPIRATION RATE: 18 BRPM

## 2020-08-21 DIAGNOSIS — E03.9 ACQUIRED HYPOTHYROIDISM: ICD-10-CM

## 2020-08-21 DIAGNOSIS — F41.1 ANXIETY STATE: ICD-10-CM

## 2020-08-21 DIAGNOSIS — Z00.00 ANNUAL PHYSICAL EXAM: Primary | ICD-10-CM

## 2020-08-21 PROCEDURE — 3075F SYST BP GE 130 - 139MM HG: CPT | Performed by: FAMILY MEDICINE

## 2020-08-21 PROCEDURE — 3079F DIAST BP 80-89 MM HG: CPT | Performed by: FAMILY MEDICINE

## 2020-08-21 PROCEDURE — 99396 PREV VISIT EST AGE 40-64: CPT | Performed by: FAMILY MEDICINE

## 2020-08-21 PROCEDURE — 3008F BODY MASS INDEX DOCD: CPT | Performed by: FAMILY MEDICINE

## 2020-08-21 RX ORDER — ALPRAZOLAM 0.5 MG/1
0.5 TABLET ORAL EVERY 4 HOURS PRN
Qty: 20 TABLET | Refills: 0 | Status: SHIPPED | OUTPATIENT
Start: 2020-08-21

## 2020-08-21 RX ORDER — LEVOTHYROXINE SODIUM 112 UG/1
112 TABLET ORAL
Qty: 90 TABLET | Refills: 3 | Status: SHIPPED | OUTPATIENT
Start: 2020-08-21 | End: 2021-08-26

## 2020-08-21 NOTE — PROGRESS NOTES
Amy Bond is a 60year old female who presents for a complete physical exam.   HPI:   Patient presents with:  Physical: WWE no pap     Annual Physical due on 08/29/2020         Symptoms: is menopausal. Patient complains of elevated BP in office, 12 [place] 99 08/14/2020 06:25 AM       No results found for: A1C, VITD       Last Valarie category :Mammogram due on 07/18/2020   [unfilled]  No results found.   Pap doen at [facility] general 4/9/2019, and normal, see care everywhere, done with HPV so 5 year pl or dental problems  HEART:  No chest pain or palpitations  LUNG:  No SOB, cough or wheeze  GI:  No abdominal pain.   No N/V/D/C  :  No dysuria  MS:  No joint pain or swelling  NEURO:  Denies numbness or tingling  PSYCH:  No mood concerns or anxiety     EX of motion. Lymphadenopathy:     She has no cervical adenopathy. She has no axillary adenopathy. Neurological: She is alert and oriented to person, place, and time. She has normal strength and normal reflexes.  No cranial nerve deficit or sensory def Stable function continue present management          Relevant Medications    Sertraline HCl 50 MG Oral Tab      Other Visit Diagnoses     Annual physical exam    -  Primary         Return in about 1 year (around 8/21/2021) for Annual physical.    [facility]

## 2020-09-17 ENCOUNTER — MED REC SCAN ONLY (OUTPATIENT)
Dept: FAMILY MEDICINE CLINIC | Facility: CLINIC | Age: 65
End: 2020-09-17

## 2020-12-14 NOTE — TELEPHONE ENCOUNTER
LOV 12/17/2018 and at that time, pt was advised to follow up in 3 months. No future appointments.      Refill request for:    Requested Prescriptions     Pending Prescriptions Disp Refills   • SERTRALINE HCL 50 MG Oral Tab [Pharmacy Med Name: Sertraline Detail Level: Zone Include Location In Plan?: Yes Hide Include Location In Plan Question?: No

## 2021-03-18 ENCOUNTER — TELEPHONE (OUTPATIENT)
Dept: FAMILY MEDICINE CLINIC | Facility: CLINIC | Age: 66
End: 2021-03-18

## 2021-03-18 DIAGNOSIS — K64.8 OTHER HEMORRHOIDS: ICD-10-CM

## 2021-03-18 DIAGNOSIS — E03.9 ACQUIRED HYPOTHYROIDISM: Primary | ICD-10-CM

## 2021-03-18 DIAGNOSIS — Z13.6 SCREENING FOR CARDIOVASCULAR CONDITION: ICD-10-CM

## 2021-03-18 DIAGNOSIS — Z00.00 LABORATORY EXAMINATION ORDERED AS PART OF A ROUTINE GENERAL MEDICAL EXAMINATION: ICD-10-CM

## 2021-03-18 DIAGNOSIS — Z12.31 VISIT FOR SCREENING MAMMOGRAM: ICD-10-CM

## 2021-03-18 NOTE — TELEPHONE ENCOUNTER
Please enter lab orders for the patient's upcoming physical appointment. Physical scheduled:    Your appointments     Date & Time Appointment Department [facility])    Aug 26, 2021  7:45 AM CDT Physical - Established with Herminia Morocho MD [facility]

## 2021-07-13 ENCOUNTER — HOSPITAL ENCOUNTER (OUTPATIENT)
Dept: MAMMOGRAPHY | Age: 66
Discharge: HOME | End: 2021-07-13
Attending: FAMILY MEDICINE

## 2021-07-13 ENCOUNTER — TELEPHONE (OUTPATIENT)
Dept: FAMILY MEDICINE CLINIC | Facility: CLINIC | Age: 66
End: 2021-07-13

## 2021-07-13 DIAGNOSIS — Z12.31 ENCOUNTER FOR SCREENING MAMMOGRAM FOR MALIGNANT NEOPLASM OF BREAST: ICD-10-CM

## 2021-07-13 PROCEDURE — 77063 BREAST TOMOSYNTHESIS BI: CPT

## 2021-07-13 NOTE — TELEPHONE ENCOUNTER
Received Mammogram imaging report from [facility].  Results placed in Dr German Traore Matters

## 2021-07-26 NOTE — TELEPHONE ENCOUNTER
1. Acquired hypothyroidism (Primary)  Overview:  Levothyroxine 125 diagnosed age 13  Orders:  -     TSH W Reflex To Free T4  2. Other hemorrhoids  -     CBC, Platelet; No Differential  3.  Laboratory examination ordered as part of a routine general medical

## 2021-08-14 LAB
ALBUMIN/GLOBULIN RATIO: 1.2 (CALC) (ref 1–2.5)
ALBUMIN: 4.2 G/DL (ref 3.6–5.1)
ALKALINE PHOSPHATASE: 69 U/L (ref 37–153)
ALT: 13 U/L (ref 6–29)
AST: 25 U/L (ref 10–35)
BILIRUBIN, TOTAL: 0.8 MG/DL (ref 0.2–1.2)
BUN: 12 MG/DL (ref 7–25)
CALCIUM: 9.5 MG/DL (ref 8.6–10.4)
CARBON DIOXIDE: 27 MMOL/L (ref 20–32)
CHLORIDE: 105 MMOL/L (ref 98–110)
CHOL/HDLC RATIO: 2.9 (CALC)
CHOLESTEROL, TOTAL: 200 MG/DL
CREATININE: 0.9 MG/DL (ref 0.5–0.99)
EGFR IF AFRICN AM: 78 ML/MIN/1.73M2
EGFR IF NONAFRICN AM: 67 ML/MIN/1.73M2
GLOBULIN: 3.6 G/DL (CALC) (ref 1.9–3.7)
GLUCOSE: 90 MG/DL (ref 65–99)
HDL CHOLESTEROL: 68 MG/DL
HEMATOCRIT: 43.3 % (ref 35–45)
HEMOGLOBIN: 14 G/DL (ref 11.7–15.5)
LDL-CHOLESTEROL: 115 MG/DL (CALC)
MCH: 29.3 PG (ref 27–33)
MCHC: 32.3 G/DL (ref 32–36)
MCV: 90.6 FL (ref 80–100)
MPV: 11.5 FL (ref 7.5–12.5)
NON-HDL CHOLESTEROL: 132 MG/DL (CALC)
PLATELET COUNT: 206 THOUSAND/UL (ref 140–400)
POTASSIUM: 4.3 MMOL/L (ref 3.5–5.3)
PROTEIN, TOTAL: 7.8 G/DL (ref 6.1–8.1)
RDW: 12.4 % (ref 11–15)
RED BLOOD CELL COUNT: 4.78 MILLION/UL (ref 3.8–5.1)
SODIUM: 140 MMOL/L (ref 135–146)
T4, FREE: 1.2 NG/DL (ref 0.8–1.8)
TRIGLYCERIDES: 78 MG/DL
TSH W/REFLEX TO FT4: 7.65 MIU/L (ref 0.4–4.5)
WHITE BLOOD CELL COUNT: 6.2 THOUSAND/UL (ref 3.8–10.8)

## 2021-08-16 ENCOUNTER — PATIENT MESSAGE (OUTPATIENT)
Dept: FAMILY MEDICINE CLINIC | Facility: CLINIC | Age: 66
End: 2021-08-16

## 2021-08-16 NOTE — TELEPHONE ENCOUNTER
From: Marco Mcdowell  To: Paul Galo MD  Sent: 8/16/2021 7:54 AM CDT  Subject: Test Results Question    Hi Dr Bridgette Hampton  I saw the results and can tell my thyroid is off.  The pharmacy changes manufacturers occasionally and I think that what causes the prob

## 2021-08-25 PROBLEM — S62.616A CLOSED DISPLACED FRACTURE OF PROXIMAL PHALANX OF RIGHT LITTLE FINGER: Status: RESOLVED | Noted: 2017-06-28 | Resolved: 2021-08-25

## 2021-08-26 ENCOUNTER — OFFICE VISIT (OUTPATIENT)
Dept: FAMILY MEDICINE CLINIC | Facility: CLINIC | Age: 66
End: 2021-08-26
Payer: COMMERCIAL

## 2021-08-26 VITALS
SYSTOLIC BLOOD PRESSURE: 128 MMHG | DIASTOLIC BLOOD PRESSURE: 72 MMHG | HEIGHT: 65 IN | HEART RATE: 96 BPM | BODY MASS INDEX: 23.66 KG/M2 | WEIGHT: 142 LBS | RESPIRATION RATE: 18 BRPM

## 2021-08-26 DIAGNOSIS — R03.0 WHITE COAT SYNDROME WITHOUT DIAGNOSIS OF HYPERTENSION: ICD-10-CM

## 2021-08-26 DIAGNOSIS — Z78.0 ASYMPTOMATIC MENOPAUSE: ICD-10-CM

## 2021-08-26 DIAGNOSIS — E03.9 ACQUIRED HYPOTHYROIDISM: ICD-10-CM

## 2021-08-26 DIAGNOSIS — F41.1 ANXIETY STATE: ICD-10-CM

## 2021-08-26 DIAGNOSIS — Z00.00 ANNUAL PHYSICAL EXAM: Primary | ICD-10-CM

## 2021-08-26 PROCEDURE — 3008F BODY MASS INDEX DOCD: CPT | Performed by: FAMILY MEDICINE

## 2021-08-26 PROCEDURE — 99397 PER PM REEVAL EST PAT 65+ YR: CPT | Performed by: FAMILY MEDICINE

## 2021-08-26 PROCEDURE — 3078F DIAST BP <80 MM HG: CPT | Performed by: FAMILY MEDICINE

## 2021-08-26 PROCEDURE — 90471 IMMUNIZATION ADMIN: CPT | Performed by: FAMILY MEDICINE

## 2021-08-26 PROCEDURE — 3074F SYST BP LT 130 MM HG: CPT | Performed by: FAMILY MEDICINE

## 2021-08-26 PROCEDURE — 90732 PPSV23 VACC 2 YRS+ SUBQ/IM: CPT | Performed by: FAMILY MEDICINE

## 2021-08-26 RX ORDER — LEVOTHYROXINE SODIUM 112 UG/1
112 TABLET ORAL
Qty: 90 TABLET | Refills: 3 | Status: SHIPPED | OUTPATIENT
Start: 2021-08-26

## 2021-08-26 RX ORDER — ESCITALOPRAM OXALATE 5 MG/1
5 TABLET ORAL DAILY
Qty: 90 TABLET | Refills: 1 | Status: SHIPPED | OUTPATIENT
Start: 2021-08-26

## 2021-08-26 NOTE — ASSESSMENT & PLAN NOTE
Stable, Continue present management.     Thyroid  (most recent labs)   Lab Results   Component Value Date/Time    TSH 2.35 08/14/2020 06:25 AM    T4F 1.2 08/13/2021 06:46 AM    TSHT4 7.65 (H) 08/13/2021 06:46 AM         Endocrine Medications          Levoth

## 2021-08-26 NOTE — PROGRESS NOTES
Severo Ron is a 73year old female who presents for a complete physical exam.     had concerns including Physical (WWE, no pap, would like to discuss on scheduling dexa scan ).    Her last annual assessment has been over 1 year: Annual Physical due o Scan:    XR DEXA BONE DENSITOMETRY (CPT=77080) 01/30/2017     Colonoscopy due on 03/02/2025   No recommendations at this time   Fall Risk Screening due on 01/21/2021  DEXA Scan due on 01/21/2021  Pneumococcal Vaccine: 65+ Years(1 of 1 - PPSV23) Never done from the following:    Height as of this encounter: 5' 5\" (1.651 m). Weight as of this encounter: 142 lb (64.4 kg). Physical Exam  Vitals and nursing note reviewed. Constitutional:       General: She is not in acute distress.      Appearance: Normal Marielos Trammell is a 73year old female who presents for a complete physical exam.   Pt's weight is Body mass index is 23.63 kg/m². , recommended low fat diet and aerobic exercise 30 minutes three times weekly.    Health maintenance, Up to date    Immuni every morning before breakfast.  Dispense: 90 tablet; Refill: 3  4. Anxiety state  Overview:  , due to dealing with mother's dementia, switched to escitalopram 5 prn 8/26/2021   Assessment & Plan:  Switch meds.  Risks and benefits discussed and patient agre

## 2021-08-30 ENCOUNTER — TELEPHONE (OUTPATIENT)
Dept: FAMILY MEDICINE CLINIC | Facility: CLINIC | Age: 66
End: 2021-08-30

## 2021-08-30 ENCOUNTER — HOSPITAL ENCOUNTER (OUTPATIENT)
Dept: BONE DENSITY | Age: 66
Discharge: HOME | End: 2021-08-30
Attending: FAMILY MEDICINE
Payer: COMMERCIAL

## 2021-08-30 DIAGNOSIS — Z78.0 ASYMPTOMATIC MENOPAUSE: ICD-10-CM

## 2021-08-30 PROCEDURE — 77080 DXA BONE DENSITY AXIAL: CPT | Performed by: FAMILY MEDICINE

## 2021-08-31 NOTE — TELEPHONE ENCOUNTER
Patient called and discussed if she should be taking a calcium supplement I suggested she try tums and vitamin D she will try to do this to help her bones.

## 2021-11-04 ENCOUNTER — OFFICE VISIT (OUTPATIENT)
Dept: FAMILY MEDICINE CLINIC | Facility: CLINIC | Age: 66
End: 2021-11-04
Payer: COMMERCIAL

## 2021-11-04 VITALS
WEIGHT: 142 LBS | BODY MASS INDEX: 23.66 KG/M2 | SYSTOLIC BLOOD PRESSURE: 134 MMHG | RESPIRATION RATE: 15 BRPM | HEART RATE: 98 BPM | HEIGHT: 65 IN | DIASTOLIC BLOOD PRESSURE: 70 MMHG | TEMPERATURE: 98 F

## 2021-11-04 DIAGNOSIS — F41.1 ANXIETY STATE: ICD-10-CM

## 2021-11-04 DIAGNOSIS — E03.9 ACQUIRED HYPOTHYROIDISM: ICD-10-CM

## 2021-11-04 DIAGNOSIS — R14.2 BELCHING: Primary | ICD-10-CM

## 2021-11-04 PROCEDURE — 3008F BODY MASS INDEX DOCD: CPT | Performed by: FAMILY MEDICINE

## 2021-11-04 PROCEDURE — 3075F SYST BP GE 130 - 139MM HG: CPT | Performed by: FAMILY MEDICINE

## 2021-11-04 PROCEDURE — 99213 OFFICE O/P EST LOW 20 MIN: CPT | Performed by: FAMILY MEDICINE

## 2021-11-04 PROCEDURE — 3078F DIAST BP <80 MM HG: CPT | Performed by: FAMILY MEDICINE

## 2021-11-04 RX ORDER — BUPROPION HYDROCHLORIDE 150 MG/1
150 TABLET ORAL DAILY
Qty: 90 TABLET | Refills: 1 | Status: SHIPPED | OUTPATIENT
Start: 2021-11-04

## 2021-11-04 NOTE — PROGRESS NOTES
Sammy Vail is a 73year old female coming in for had concerns including [place] (STOMACH DISCOMFORT , COUPLE MONTHS , POSSIBLE GERD ). Subjective:   HPI lots of belching and epigastic isses. Worse foods last week and more symptoms.      ROS: GENERAL HEALTH: fe VITAMINS-MINERALS OR, ALPRAZolam, escitalopram, and levothyroxine. Return in about 6 months (around 5/4/2022) for recheck.     Ganesh Cortez MD, 11/4/2021, 12:23 PM

## 2021-11-17 ENCOUNTER — TELEPHONE (OUTPATIENT)
Dept: FAMILY MEDICINE CLINIC | Facility: CLINIC | Age: 66
End: 2021-11-17

## 2021-11-17 DIAGNOSIS — E03.9 ACQUIRED HYPOTHYROIDISM: Primary | ICD-10-CM

## 2021-11-17 NOTE — TELEPHONE ENCOUNTER
From: Otis Mensah Jeremias   Sent: 11/17/2021   9:41 AM CST   To: Christopher Rivas Customer Response Pool   Subject: test results                                       Topic: <<Select One of the Topics Below>>.       Ugo Elliott Read;   I'm fine with staying on this dose of

## 2022-02-25 LAB
T4, FREE: 1.3 NG/DL (ref 0.8–1.8)
TSH: 3.8 MIU/L (ref 0.4–4.5)

## 2022-03-07 NOTE — TELEPHONE ENCOUNTER
Spoke to pt again. She never started Escitalopram or Wellbutrin. She is only taking   Sertraline 50 mg twice weekly. Future Appointments   Date Time Provider Catie Cardenas   5/23/2022 12:15 PM Raeford Carrel, MD [facility]     Routed to Dr Laila Degroot.

## 2022-03-07 NOTE — TELEPHONE ENCOUNTER
Please check with her, we had her on escitalopram but it looks like in November she was having trouble with it and may have gone back to the Zoloft.   We had started Wellbutrin but I do not have any follow-up from her about how she is doing

## 2022-03-31 ENCOUNTER — TELEPHONE (OUTPATIENT)
Dept: FAMILY MEDICINE CLINIC | Facility: CLINIC | Age: 67
End: 2022-03-31

## 2022-03-31 NOTE — TELEPHONE ENCOUNTER
For anyone at [address] [place] [address].  Thanks and stay well, MD Dr David Zuleta and [address] [place] [address]  [phone]  [facility]

## 2022-03-31 NOTE — TELEPHONE ENCOUNTER
Dr. Vince Boland patient last seen 11/4/21 for belching. Patient states this has continued and wondering if she should see gastroenterology. Okay to refer?

## 2022-04-06 ENCOUNTER — OFFICE VISIT (OUTPATIENT)
Dept: FAMILY MEDICINE CLINIC | Facility: CLINIC | Age: 67
End: 2022-04-06
Payer: MEDICARE

## 2022-04-06 VITALS
RESPIRATION RATE: 22 BRPM | DIASTOLIC BLOOD PRESSURE: 80 MMHG | HEART RATE: 100 BPM | BODY MASS INDEX: 23.46 KG/M2 | SYSTOLIC BLOOD PRESSURE: 150 MMHG | WEIGHT: 140.81 LBS | HEIGHT: 65 IN

## 2022-04-06 DIAGNOSIS — R03.0 WHITE COAT SYNDROME WITHOUT DIAGNOSIS OF HYPERTENSION: ICD-10-CM

## 2022-04-06 DIAGNOSIS — F41.1 ANXIETY STATE: ICD-10-CM

## 2022-04-06 DIAGNOSIS — R10.13 EPIGASTRIC PAIN: Primary | ICD-10-CM

## 2022-04-06 PROCEDURE — 99214 OFFICE O/P EST MOD 30 MIN: CPT | Performed by: FAMILY MEDICINE

## 2022-04-06 RX ORDER — FAMOTIDINE 40 MG/1
40 TABLET, FILM COATED ORAL 2 TIMES DAILY
Qty: 180 TABLET | Refills: 3 | Status: SHIPPED | OUTPATIENT
Start: 2022-04-06

## 2022-04-06 RX ORDER — ALPRAZOLAM 0.5 MG/1
0.5 TABLET ORAL EVERY 4 HOURS PRN
Qty: 40 TABLET | Refills: 1 | Status: SHIPPED | OUTPATIENT
Start: 2022-04-06

## 2022-05-04 ENCOUNTER — TELEPHONE (OUTPATIENT)
Dept: FAMILY MEDICINE CLINIC | Facility: CLINIC | Age: 67
End: 2022-05-04

## 2022-05-04 NOTE — TELEPHONE ENCOUNTER
Please enter lab orders for the patient's upcoming physical appointment. Physical scheduled: Your appointments     Date & Time Appointment Department [facility])    Aug 10, 2022 11:00 AM CDT Medicare Annual Well Visit with Janine Vera MD [facility], [address], [address] [facility]  (Gerald Amaya)            Robert Contreras [address] [facility] [phone]         Preferred lab: [facility]     The patient has been notified to complete fasting labs prior to their physical appointment.

## 2022-05-04 NOTE — TELEPHONE ENCOUNTER
LOV 4/6/22. Last mammogram completed 7/13/21. Patient not due to complete until after 7/13/22. Mammogram ordered per protocol. Patient notified by lindyt.

## 2022-05-04 NOTE — TELEPHONE ENCOUNTER
Patient is requesting an order for her annual screening mammogram.    Patient has been notified to allow 2-3 business days for order placement. Reviewed with patient that she may schedule mammogram via [employer] or by calling Central Scheduling at that time. Request for mammogram order routed to triage.

## 2022-05-13 ENCOUNTER — TELEPHONE (OUTPATIENT)
Dept: FAMILY MEDICINE CLINIC | Facility: CLINIC | Age: 67
End: 2022-05-13

## 2022-05-13 NOTE — TELEPHONE ENCOUNTER
1. Stomach discomfort (Primary)  -     EVALUATE & TREAT, GASTRO (INTERNAL)   Dr Marcos Alex or anyone at Dr [place] Roberts and [address] [place] [address]  [phone]  [address] [place] [address] Street to notify.  Thanks, Katie Alvarado MD
Called patient and gave name and number of referral she will call for an appointment
Pt calling. States she is still having the stomach issues- discomfort, belching. She had discussed with Dr. Deanna Akers about possibly getting an endoscopy. Pt would like to know if Dr. Deanna Akers can place the order for one.
36.6
REDNESS/PHOTOPHOBIA/BLURRED VISION/FOREIGN BODY/EYE LID SWELLING/DISCHARGE/DRAINAGE/PAIN

## 2022-07-18 ENCOUNTER — HOSPITAL ENCOUNTER (OUTPATIENT)
Dept: MAMMOGRAPHY | Age: 67
Discharge: HOME | End: 2022-07-18
Attending: FAMILY MEDICINE
Payer: MEDICARE

## 2022-07-18 DIAGNOSIS — Z12.31 ENCOUNTER FOR SCREENING MAMMOGRAM FOR MALIGNANT NEOPLASM OF BREAST: ICD-10-CM

## 2022-07-18 PROCEDURE — 77067 SCR MAMMO BI INCL CAD: CPT | Performed by: FAMILY MEDICINE

## 2022-07-18 PROCEDURE — 77063 BREAST TOMOSYNTHESIS BI: CPT | Performed by: FAMILY MEDICINE

## 2022-08-03 ENCOUNTER — TELEPHONE (OUTPATIENT)
Dept: FAMILY MEDICINE CLINIC | Facility: CLINIC | Age: 67
End: 2022-08-03

## 2022-08-06 LAB
ALBUMIN/GLOBULIN RATIO: 1.3 (CALC) (ref 1–2.5)
ALBUMIN: 4.2 G/DL (ref 3.6–5.1)
ALKALINE PHOSPHATASE: 70 U/L (ref 37–153)
ALT: 16 U/L (ref 6–29)
AST: 25 U/L (ref 10–35)
BILIRUBIN, TOTAL: 0.6 MG/DL (ref 0.2–1.2)
BUN: 13 MG/DL (ref 7–25)
CALCIUM: 9.6 MG/DL (ref 8.6–10.4)
CARBON DIOXIDE: 28 MMOL/L (ref 20–32)
CHLORIDE: 104 MMOL/L (ref 98–110)
CHOL/HDLC RATIO: 2.6 (CALC)
CHOLESTEROL, TOTAL: 182 MG/DL
CREATININE: 0.73 MG/DL (ref 0.5–1.05)
EGFR: 91 ML/MIN/1.73M2
GLOBULIN: 3.2 G/DL (CALC) (ref 1.9–3.7)
GLUCOSE: 95 MG/DL (ref 65–99)
HDL CHOLESTEROL: 71 MG/DL
HEMATOCRIT: 43.5 % (ref 35–45)
HEMOGLOBIN A1C: 5.1 % OF TOTAL HGB
HEMOGLOBIN: 14.2 G/DL (ref 11.7–15.5)
LDL-CHOLESTEROL: 97 MG/DL (CALC)
MCH: 29.8 PG (ref 27–33)
MCHC: 32.6 G/DL (ref 32–36)
MCV: 91.2 FL (ref 80–100)
MPV: 11.4 FL (ref 7.5–12.5)
NON-HDL CHOLESTEROL: 111 MG/DL (CALC)
PLATELET COUNT: 183 THOUSAND/UL (ref 140–400)
POTASSIUM: 4.5 MMOL/L (ref 3.5–5.3)
PROTEIN, TOTAL: 7.4 G/DL (ref 6.1–8.1)
RDW: 12.5 % (ref 11–15)
RED BLOOD CELL COUNT: 4.77 MILLION/UL (ref 3.8–5.1)
SODIUM: 139 MMOL/L (ref 135–146)
TRIGLYCERIDES: 57 MG/DL
TSH W/REFLEX TO FT4: 3.31 MIU/L (ref 0.4–4.5)
WHITE BLOOD CELL COUNT: 9.2 THOUSAND/UL (ref 3.8–10.8)

## 2022-08-08 RX ORDER — FLUOROURACIL 50 MG/G
CREAM TOPICAL
COMMUNITY
Start: 2022-04-22

## 2022-08-09 NOTE — ASSESSMENT & PLAN NOTE
Stable, Continue present management.     Thyroid  (most recent labs)   Lab Results   Component Value Date/Time    TSH 3.80 02/24/2022 06:49 AM    T4F 1.3 02/24/2022 06:49 AM    TSHT4 3.31 08/05/2022 07:26 AM         Endocrine Medications          levothyroxine 112 MCG Oral Tab

## 2022-08-10 ENCOUNTER — OFFICE VISIT (OUTPATIENT)
Dept: FAMILY MEDICINE CLINIC | Facility: CLINIC | Age: 67
End: 2022-08-10
Payer: MEDICARE

## 2022-08-10 VITALS
DIASTOLIC BLOOD PRESSURE: 70 MMHG | SYSTOLIC BLOOD PRESSURE: 138 MMHG | RESPIRATION RATE: 18 BRPM | BODY MASS INDEX: 23.38 KG/M2 | WEIGHT: 138.63 LBS | HEIGHT: 64.5 IN | HEART RATE: 82 BPM

## 2022-08-10 DIAGNOSIS — F41.1 ANXIETY STATE: ICD-10-CM

## 2022-08-10 DIAGNOSIS — E03.9 ACQUIRED HYPOTHYROIDISM: ICD-10-CM

## 2022-08-10 DIAGNOSIS — Z00.00 ENCOUNTER FOR ANNUAL HEALTH EXAMINATION: ICD-10-CM

## 2022-08-10 DIAGNOSIS — R03.0 WHITE COAT SYNDROME WITHOUT DIAGNOSIS OF HYPERTENSION: ICD-10-CM

## 2022-08-10 DIAGNOSIS — Z00.00 ANNUAL PHYSICAL EXAM: Primary | ICD-10-CM

## 2022-08-10 PROCEDURE — G0438 PPPS, INITIAL VISIT: HCPCS | Performed by: FAMILY MEDICINE

## 2022-08-10 PROCEDURE — 99213 OFFICE O/P EST LOW 20 MIN: CPT | Performed by: FAMILY MEDICINE

## 2022-08-10 RX ORDER — LEVOTHYROXINE SODIUM 112 UG/1
112 TABLET ORAL
Qty: 90 TABLET | Refills: 3 | Status: SHIPPED | OUTPATIENT
Start: 2022-08-10

## 2022-08-18 DIAGNOSIS — E03.9 ACQUIRED HYPOTHYROIDISM: ICD-10-CM

## 2022-08-19 RX ORDER — LEVOTHYROXINE SODIUM 112 UG/1
112 TABLET ORAL
Qty: 90 TABLET | Refills: 3 | OUTPATIENT
Start: 2022-08-19

## 2023-04-07 ENCOUNTER — TELEPHONE (OUTPATIENT)
Dept: FAMILY MEDICINE CLINIC | Facility: CLINIC | Age: 68
End: 2023-04-07

## 2023-04-07 DIAGNOSIS — Z12.31 ENCOUNTER FOR SCREENING MAMMOGRAM FOR MALIGNANT NEOPLASM OF BREAST: Primary | ICD-10-CM

## 2023-04-07 NOTE — TELEPHONE ENCOUNTER
Irwin from central scheduling call               [phone] requesting a new mammogram order for pt because that one we have  23. Patient is requesting an order for her annual screening mammogram.    Patient has been notified to allow 2-3 business days for order placement. Reviewed with patient that she may schedule mammogram via [employer] or by calling Central Scheduling at that time. Request for mammogram order routed to triage.

## 2023-04-20 ENCOUNTER — TELEPHONE (OUTPATIENT)
Dept: FAMILY MEDICINE CLINIC | Facility: CLINIC | Age: 68
End: 2023-04-20

## 2023-04-20 DIAGNOSIS — E03.9 ACQUIRED HYPOTHYROIDISM: Primary | ICD-10-CM

## 2023-04-20 DIAGNOSIS — Z00.00 LABORATORY EXAMINATION ORDERED AS PART OF A ROUTINE GENERAL MEDICAL EXAMINATION: ICD-10-CM

## 2023-04-20 NOTE — TELEPHONE ENCOUNTER
1. Acquired hypothyroidism (Primary)  Overview:  Levothyroxine 125 diagnosed age 13  Orders:  -     Assay, Thyroid Stim Hormone  -     Free T4, (Free Thyroxine)       OK to notify.  Thanks, Cory Taylor MD

## 2023-04-20 NOTE — TELEPHONE ENCOUNTER
Pt would like to have a Thyroid test done she is over due wanted to know if you could call this into [facility]

## 2023-07-18 LAB
ALBUMIN/GLOBULIN RATIO: 1.5 (CALC) (ref 1–2.5)
ALBUMIN: 4.5 G/DL (ref 3.6–5.1)
ALKALINE PHOSPHATASE: 81 U/L (ref 37–153)
ALT: 19 U/L (ref 6–29)
AST: 26 U/L (ref 10–35)
BILIRUBIN, TOTAL: 0.9 MG/DL (ref 0.2–1.2)
BUN: 22 MG/DL (ref 7–25)
CALCIUM: 9.9 MG/DL (ref 8.6–10.4)
CARBON DIOXIDE: 26 MMOL/L (ref 20–32)
CHLORIDE: 104 MMOL/L (ref 98–110)
CREATININE: 0.87 MG/DL (ref 0.5–1.05)
EGFR: 73 ML/MIN/1.73M2
GLOBULIN: 3.1 G/DL (CALC) (ref 1.9–3.7)
GLUCOSE: 88 MG/DL (ref 65–99)
POTASSIUM: 4.1 MMOL/L (ref 3.5–5.3)
PROTEIN, TOTAL: 7.6 G/DL (ref 6.1–8.1)
SODIUM: 139 MMOL/L (ref 135–146)
T4, FREE: 1.1 NG/DL (ref 0.8–1.8)
TSH: 5.7 MIU/L (ref 0.4–4.5)

## 2023-07-19 ENCOUNTER — HOSPITAL ENCOUNTER (OUTPATIENT)
Dept: MAMMOGRAPHY | Age: 68
Discharge: HOME | End: 2023-07-19
Attending: FAMILY MEDICINE
Payer: MEDICARE

## 2023-07-19 DIAGNOSIS — Z12.31 ENCOUNTER FOR SCREENING MAMMOGRAM FOR MALIGNANT NEOPLASM OF BREAST: ICD-10-CM

## 2023-07-19 PROCEDURE — 77067 SCR MAMMO BI INCL CAD: CPT | Performed by: FAMILY MEDICINE

## 2023-07-19 PROCEDURE — 77063 BREAST TOMOSYNTHESIS BI: CPT | Performed by: FAMILY MEDICINE

## 2023-07-24 DIAGNOSIS — F41.1 ANXIETY STATE: ICD-10-CM

## 2023-07-26 NOTE — TELEPHONE ENCOUNTER
Refill request for:    Requested Prescriptions     Pending Prescriptions Disp Refills    SERTRALINE 50 MG Oral Tab [Pharmacy Med Name: Sertraline Hcl 50 Mg Tab Nort] 90 tablet 0     Sig: Take 1 tablet (50 mg total) by mouth daily. Last Prescribed Quantity Refills   3/7/2022 90 3     LOV 8/10/2022     Patient was asked to follow-up in: 1 year    Appointment scheduled: 8/10/2023 MD MIGNON Choi visit     Medication not on protocol.      Routed to [facility]

## 2023-08-09 DIAGNOSIS — E03.9 ACQUIRED HYPOTHYROIDISM: ICD-10-CM

## 2023-08-10 ENCOUNTER — OFFICE VISIT (OUTPATIENT)
Dept: FAMILY MEDICINE CLINIC | Facility: CLINIC | Age: 68
End: 2023-08-10
Payer: MEDICARE

## 2023-08-10 VITALS
HEART RATE: 78 BPM | HEIGHT: 64.5 IN | BODY MASS INDEX: 23.24 KG/M2 | SYSTOLIC BLOOD PRESSURE: 138 MMHG | RESPIRATION RATE: 16 BRPM | WEIGHT: 137.81 LBS | DIASTOLIC BLOOD PRESSURE: 60 MMHG

## 2023-08-10 DIAGNOSIS — Z78.0 ASYMPTOMATIC MENOPAUSE: ICD-10-CM

## 2023-08-10 DIAGNOSIS — F41.1 ANXIETY STATE: ICD-10-CM

## 2023-08-10 DIAGNOSIS — Z00.00 ANNUAL PHYSICAL EXAM: Primary | ICD-10-CM

## 2023-08-10 DIAGNOSIS — R03.0 WHITE COAT SYNDROME WITHOUT DIAGNOSIS OF HYPERTENSION: ICD-10-CM

## 2023-08-10 DIAGNOSIS — Z00.00 ENCOUNTER FOR ANNUAL HEALTH EXAMINATION: ICD-10-CM

## 2023-08-10 DIAGNOSIS — Z23 NEED FOR VACCINATION: ICD-10-CM

## 2023-08-10 DIAGNOSIS — E03.9 ACQUIRED HYPOTHYROIDISM: ICD-10-CM

## 2023-08-10 DIAGNOSIS — Z78.0 ASYMPTOMATIC MENOPAUSAL STATE: ICD-10-CM

## 2023-08-10 PROCEDURE — G0009 ADMIN PNEUMOCOCCAL VACCINE: HCPCS | Performed by: FAMILY MEDICINE

## 2023-08-10 PROCEDURE — 99214 OFFICE O/P EST MOD 30 MIN: CPT | Performed by: FAMILY MEDICINE

## 2023-08-10 PROCEDURE — 90677 PCV20 VACCINE IM: CPT | Performed by: FAMILY MEDICINE

## 2023-08-10 PROCEDURE — G0438 PPPS, INITIAL VISIT: HCPCS | Performed by: FAMILY MEDICINE

## 2023-08-10 RX ORDER — ALPRAZOLAM 0.5 MG/1
0.5 TABLET ORAL EVERY 4 HOURS PRN
Qty: 40 TABLET | Refills: 1 | Status: SHIPPED | OUTPATIENT
Start: 2023-08-10

## 2023-08-10 RX ORDER — LEVOTHYROXINE SODIUM 112 UG/1
112 TABLET ORAL
Qty: 90 TABLET | Refills: 3 | Status: SHIPPED | OUTPATIENT
Start: 2023-08-10

## 2023-08-10 RX ORDER — LEVOTHYROXINE SODIUM 112 UG/1
112 TABLET ORAL
Qty: 90 TABLET | Refills: 0 | OUTPATIENT
Start: 2023-08-10

## 2023-08-10 NOTE — TELEPHONE ENCOUNTER
Requested Prescriptions     Pending Prescriptions Disp Refills    LEVOTHYROXINE 112 MCG Oral Tab [Pharmacy Med Name: Levothyroxine Sodium 112 Mcg Tab Lupi] 90 tablet 0     Sig: Take 1 tablet (112 mcg total) by mouth every morning before breakfast.     LOV 8/10/2022     Appointment scheduled: 8/10/2023 Janine Vera MD     Medication refilled today,8/10/2023,at visit. This request was denied.

## 2023-08-10 NOTE — ASSESSMENT & PLAN NOTE
Last K was 4.1 done on 7/17/2023. Last Cr was 0.87 done on 7/17/2023. Last GFR (OSCAR) was 62 done on 11/16/2021.

## 2023-08-10 NOTE — ASSESSMENT & PLAN NOTE
Thyroid shows Fair control. TSH: 5.7, done on 7/17/2023. FT4: 1.1, done on 7/17/2023. Thyroid therapy includes levothyroxine 112 MCG Oral Tab [[id number]].

## 2023-08-10 NOTE — ASSESSMENT & PLAN NOTE
Clinical Course: stable   Good control  Antidepressant Meds: sertraline Tabs - 50 MG  Anxiolytic Meds: ALPRAZolam Tabs - 0.5 MG

## 2024-02-13 LAB
T4, FREE: 1.2 NG/DL (ref 0.8–1.8)
TSH: 5.44 MIU/L (ref 0.4–4.5)

## 2024-04-15 ENCOUNTER — HOSPITAL ENCOUNTER (OUTPATIENT)
Dept: BONE DENSITY | Age: 69
Discharge: HOME | End: 2024-04-15
Attending: FAMILY MEDICINE
Payer: MEDICARE

## 2024-04-15 DIAGNOSIS — Z78.0 ASYMPTOMATIC MENOPAUSAL STATE: ICD-10-CM

## 2024-04-15 DIAGNOSIS — Z78.0 ASYMPTOMATIC MENOPAUSE: ICD-10-CM

## 2024-04-15 PROCEDURE — 77080 DXA BONE DENSITY AXIAL: CPT | Performed by: FAMILY MEDICINE

## 2024-06-06 ENCOUNTER — TELEPHONE (OUTPATIENT)
Dept: FAMILY MEDICINE CLINIC | Facility: CLINIC | Age: 69
End: 2024-06-06

## 2024-06-06 DIAGNOSIS — Z12.31 SCREENING MAMMOGRAM FOR BREAST CANCER: Primary | ICD-10-CM

## 2024-06-06 NOTE — TELEPHONE ENCOUNTER
Patient is requesting an order for her annual screening mammogram.    Patient has been notified to allow 2-3 business days for order placement. Reviewed with patient that she may schedule mammogram via [employer] or by calling Central Scheduling at that time.    Request for mammogram order routed to triage.

## 2024-07-23 ENCOUNTER — HOSPITAL ENCOUNTER (OUTPATIENT)
Dept: MAMMOGRAPHY | Age: 69
Discharge: HOME | End: 2024-07-23
Attending: FAMILY MEDICINE
Payer: MEDICARE

## 2024-07-23 DIAGNOSIS — Z12.31 SCREENING MAMMOGRAM FOR BREAST CANCER: ICD-10-CM

## 2024-07-23 PROCEDURE — 77067 SCR MAMMO BI INCL CAD: CPT | Performed by: FAMILY MEDICINE

## 2024-07-23 PROCEDURE — 77063 BREAST TOMOSYNTHESIS BI: CPT | Performed by: FAMILY MEDICINE

## 2024-07-29 ENCOUNTER — TELEPHONE (OUTPATIENT)
Dept: FAMILY MEDICINE CLINIC | Facility: CLINIC | Age: 69
End: 2024-07-29

## 2024-07-29 DIAGNOSIS — E55.9 VITAMIN D DEFICIENCY: ICD-10-CM

## 2024-07-29 DIAGNOSIS — Z13.0 SCREENING FOR IRON DEFICIENCY ANEMIA: ICD-10-CM

## 2024-07-29 DIAGNOSIS — E03.9 ACQUIRED HYPOTHYROIDISM: ICD-10-CM

## 2024-07-29 DIAGNOSIS — I10 ESSENTIAL HYPERTENSION: ICD-10-CM

## 2024-07-29 DIAGNOSIS — Z13.6 SCREENING FOR CARDIOVASCULAR CONDITION: ICD-10-CM

## 2024-07-29 DIAGNOSIS — Z13.1 SCREENING FOR DIABETES MELLITUS: Primary | ICD-10-CM

## 2024-07-29 DIAGNOSIS — Z13.29 SCREENING FOR THYROID DISORDER: ICD-10-CM

## 2024-07-29 NOTE — TELEPHONE ENCOUNTER
Please enter lab orders for the patient's upcoming physical appointment.     Physical scheduled:   Your appointments       Date & Time Appointment Department ([place])    Aug 30, 2024 7:45 AM CDT Medicare Annual Well Visit with Magan Marquis MD [facility] ([facility])              [facility]  [address]  [facility] [address]  [phone]           Preferred lab: [facility]     The patient has been notified to complete fasting labs prior to their physical appointment.

## 2024-07-29 NOTE — TELEPHONE ENCOUNTER
1. Screening for diabetes mellitus (Primary)  -     Comp Metabolic Panel (14)  2. Acquired hypothyroidism  Overview:  Levothyroxine 125 diagnosed age 17  Orders:  -     TSH W Reflex To Free T4  3. Screening for iron deficiency anemia  -     CBC With Differential With Platelet  4. Screening for thyroid disorder  -     TSH W Reflex To Free T4  5. Screening for cardiovascular condition  -     Lipid Panel  6. Essential hypertension  -     CBC With Differential With Platelet  -     Comp Metabolic Panel (14)  7. Vitamin D deficiency  -     Vitamin D, 25-Hydroxy       OK to notify. Thanks, Marko Marquis MD

## 2024-08-10 LAB
ABSOLUTE BASOPHILS: 71 CELLS/UL (ref 0–200)
ABSOLUTE EOSINOPHILS: 201 CELLS/UL (ref 15–500)
ABSOLUTE LYMPHOCYTES: 1870 CELLS/UL (ref 850–3900)
ABSOLUTE MONOCYTES: 448 CELLS/UL (ref 200–950)
ABSOLUTE NEUTROPHILS: 3310 CELLS/UL (ref 1500–7800)
ALBUMIN/GLOBULIN RATIO: 1.2 (CALC) (ref 1–2.5)
ALBUMIN: 4.2 G/DL (ref 3.6–5.1)
ALKALINE PHOSPHATASE: 79 U/L (ref 37–153)
ALT: 19 U/L (ref 6–29)
AST: 28 U/L (ref 10–35)
BASOPHILS: 1.2 %
BILIRUBIN, TOTAL: 1.1 MG/DL (ref 0.2–1.2)
BUN: 11 MG/DL (ref 7–25)
CALCIUM: 10.4 MG/DL (ref 8.6–10.4)
CARBON DIOXIDE: 26 MMOL/L (ref 20–32)
CHLORIDE: 105 MMOL/L (ref 98–110)
CHOL/HDLC RATIO: 2.8 (CALC)
CHOLESTEROL, TOTAL: 189 MG/DL
CREATININE: 0.89 MG/DL (ref 0.5–1.05)
EGFR: 71 ML/MIN/1.73M2
EOSINOPHILS: 3.4 %
GLOBULIN: 3.5 G/DL (CALC) (ref 1.9–3.7)
GLUCOSE: 96 MG/DL (ref 65–99)
HDL CHOLESTEROL: 67 MG/DL
HEMATOCRIT: 46.4 % (ref 35–45)
HEMOGLOBIN: 15 G/DL (ref 11.7–15.5)
LDL-CHOLESTEROL: 100 MG/DL (CALC)
LYMPHOCYTES: 31.7 %
MCH: 30.3 PG (ref 27–33)
MCHC: 32.3 G/DL (ref 32–36)
MCV: 93.7 FL (ref 80–100)
MONOCYTES: 7.6 %
MPV: 11.8 FL (ref 7.5–12.5)
NEUTROPHILS: 56.1 %
NON-HDL CHOLESTEROL: 122 MG/DL (CALC)
PLATELET COUNT: 210 THOUSAND/UL (ref 140–400)
POTASSIUM: 4 MMOL/L (ref 3.5–5.3)
PROTEIN, TOTAL: 7.7 G/DL (ref 6.1–8.1)
RDW: 12.4 % (ref 11–15)
RED BLOOD CELL COUNT: 4.95 MILLION/UL (ref 3.8–5.1)
SODIUM: 144 MMOL/L (ref 135–146)
TRIGLYCERIDES: 121 MG/DL
TSH W/REFLEX TO FT4: 4.35 MIU/L (ref 0.4–4.5)
VITAMIN D, 25-OH, TOTAL: 39 NG/ML (ref 30–100)
WHITE BLOOD CELL COUNT: 5.9 THOUSAND/UL (ref 3.8–10.8)

## 2024-08-29 NOTE — ASSESSMENT & PLAN NOTE
Thyroid shows Fair control.   TSH: 4.35, done on 8/9/2024.  FT4: 1.2, done on 4/15/2024.   Thyroid therapy includes levothyroxine 112 MCG Oral Tab [[id number]], levothyroxine 112 MCG Oral Tab [[id number]] (Long-Term Med), levothyroxine 125 MCG Oral Tab [[id number]], levothyroxine 125 MCG Oral Tab [[id number]] (Long-Term Med).

## 2024-08-29 NOTE — ASSESSMENT & PLAN NOTE
Last K was 4 done on 8/9/2024.  Last Cr was 0.89 done on 8/9/2024.  Last eGFR was 71 on 8/9/2024.

## 2024-08-30 ENCOUNTER — OFFICE VISIT (OUTPATIENT)
Dept: FAMILY MEDICINE CLINIC | Facility: CLINIC | Age: 69
End: 2024-08-30
Payer: MEDICARE

## 2024-08-30 VITALS
SYSTOLIC BLOOD PRESSURE: 138 MMHG | WEIGHT: 137 LBS | BODY MASS INDEX: 23.1 KG/M2 | DIASTOLIC BLOOD PRESSURE: 80 MMHG | HEIGHT: 64.5 IN

## 2024-08-30 DIAGNOSIS — R03.0 WHITE COAT SYNDROME WITHOUT DIAGNOSIS OF HYPERTENSION: ICD-10-CM

## 2024-08-30 DIAGNOSIS — Z00.00 ENCOUNTER FOR ANNUAL HEALTH EXAMINATION: ICD-10-CM

## 2024-08-30 DIAGNOSIS — F41.1 ANXIETY STATE: ICD-10-CM

## 2024-08-30 DIAGNOSIS — E03.9 ACQUIRED HYPOTHYROIDISM: ICD-10-CM

## 2024-08-30 DIAGNOSIS — Z00.00 ANNUAL PHYSICAL EXAM: Primary | ICD-10-CM

## 2024-08-30 RX ORDER — ALPRAZOLAM 0.5 MG
0.5 TABLET ORAL EVERY 4 HOURS PRN
Qty: 40 TABLET | Refills: 1 | Status: SHIPPED | OUTPATIENT
Start: 2024-08-30

## 2024-08-30 RX ORDER — LEVOTHYROXINE SODIUM 112 UG/1
112 TABLET ORAL
Qty: 90 TABLET | Refills: 3 | Status: SHIPPED | OUTPATIENT
Start: 2024-08-30

## 2024-08-30 NOTE — PATIENT INSTRUCTIONS
Tata Mcdowell's SCREENING SCHEDULE   Tests on this list are recommended by your physician but may not be covered, or covered at this frequency, by your insurer.   Please check with your insurance carrier before scheduling to verify coverage.   PREVENTATIVE SERVICES FREQUENCY &  COVERAGE DETAILS LAST COMPLETION DATE   Diabetes Screening    Fasting Blood Sugar /  Glucose    One screening every 12 months if never tested or if previously tested but not diagnosed with pre-diabetes   One screening every 6 months if diagnosed with pre-diabetes Lab Results   Component Value Date    GLU 96 08/09/2024        Cardiovascular Disease Screening    Lipid Panel  Cholesterol  Lipoprotein (HDL)  Triglycerides Covered every 5 years for all Medicare beneficiaries without apparent signs or symptoms of cardiovascular disease Lab Results   Component Value Date    CHOLEST 189 08/09/2024    HDL 67 08/09/2024     (H) 08/09/2024    TRIG 121 08/09/2024         Electrocardiogram (EKG)   Covered if needed at Welcome to Medicare, and non-screening if indicated for medical reasons -      Ultrasound Screening for Abdominal Aortic Aneurysm (AAA) Covered once in a lifetime for one of the following risk factors   • Men who are 65-75 years old and have ever smoked   • Anyone with a family history -     Colorectal Cancer Screening  Covered for ages 50-85; only need ONE of the following:    Colonoscopy   Covered every 10 years    Covered every 2 years if patient is at high risk or previous colonoscopy was abnormal 03/02/2020    Health Maintenance   Topic Date Due   • Colorectal Cancer Screening  03/02/2025       Flexible Sigmoidoscopy   Covered every 4 years -    Fecal Occult Blood Test Covered annually -   Bone Density Screening    Bone density screening    Covered every 2 years after age 65 if diagnosed with risk of osteoporosis or estrogen deficiency.    Covered yearly for long-term glucocorticoid medication use (Steroids) Last Dexa  Scan:    XR DEXA BONE DENSITOMETRY (CPT=77080) 04/15/2024      No recommendations at this time   Pap and Pelvic    Pap   Covered every 2 years for women at normal risk; Annually if at high risk -  No recommendations at this time    Chlamydia Annually if high risk -  No recommendations at this time   Screening Mammogram    Mammogram     Recommend annually for all female patients aged 40 and older    One baseline mammogram covered for patients aged 35-39 07/23/2024    Health Maintenance   Topic Date Due   • Mammogram  07/23/2025       Immunizations    Influenza Covered once per flu season  Please get every year -  No recommendations at this time    Pneumococcal Each vaccine ([id number] & [id number]) covered once after 65 Prevnar 13: -    [id number]: 08/26/2021     No recommendations at this time    Hepatitis B One screening covered for patients with certain risk factors   -  No recommendations at this time    Tetanus Toxoid Not covered by Medicare Part B unless medically necessary (cut with metal); may be covered with your pharmacy prescription benefits -    Tetanus, Diptheria and Pertusis TD and TDaP Not covered by Medicare Part B -  No recommendations at this time    Zoster Not covered by Medicare Part B; may be covered with your pharmacy  prescription benefits -  No recommendations at this time

## 2024-08-30 NOTE — PROGRESS NOTES
Subjective:   Tata Mcdowell is a 69 year old female who presents for a Subsequent Annual Wellness visit (Pt already had Initial Annual Wellness) and scheduled follow up of multiple significant but stable problems.   Overall doing well.  Thyroid level is excellent alternating 112 with 125, history of whitecoat syndrome, stable anxiety.  She uses about 3 months at a time twice a year with rare Xanax use    History/Other:   Fall Risk Assessment:   She has been screened for Falls and is low risk.      Cognitive Assessment:   She had a completely normal cognitive assessment - see flowsheet entries     Functional Ability/Status:   Tata Mcdowell has a completely normal functional assessment. See flowsheet for details.      Depression Screening (PHQ):  PHQ-2 SCORE: 0  , done 8/30/2024   If you checked off any problems, how difficult have these problems made it for you to do your work, take care of things at home, or get along with other people?: Not difficult at all    Home BP is normal. 111.65.     The 10-year ASCVD risk score (Jayden BUSTAMANTE, et al., 2019) is: 8.2%    Values used to calculate the score:      Age: 69 years      Sex: Female      Is Non- : No      Diabetic: No      Tobacco smoker: No      Systolic Blood Pressure: 138 mmHg      Is BP treated: No      HDL Cholesterol: 67 mg/dL      Total Cholesterol: 189 mg/dL     Advanced Directives:   She does NOT have a Living Will. [Do you have a living will?: No]  She has a Power of  for Health Care on file in [employer].  Discussed Advance Care Planning with patient (and family/surrogate if present). Standard forms made available to patient in After Visit Summary.      Patient Active Problem List   Diagnosis    Acquired hypothyroidism    Anxiety state    White coat syndrome without diagnosis of hypertension    Personal history of colonic polyps    Other hemorrhoids     Allergies:  She has No Known Allergies.    Current  Medications:  Outpatient Medications Marked as Taking for the 8/30/24 encounter (Office Visit) with Magan Marquis MD   Medication Sig    ALPRAZolam 0.5 MG Oral Tab Take 1 tablet (0.5 mg total) by mouth every 4 (four) hours as needed for Anxiety.    levothyroxine 112 MCG Oral Tab Take 1 tablet (112 mcg total) by mouth every morning before breakfast.    sertraline 50 MG Oral Tab Take 1 tablet (50 mg total) by mouth daily.    levothyroxine 125 MCG Oral Tab Take 1 tablet (125 mcg total) by mouth 3 (three) times a week.    famotidine 40 MG Oral Tab Take 1 tablet (40 mg total) by mouth 2 (two) times daily.       Medical History:  She  has a past medical history of Anxiety, Closed displaced fracture of proximal phalanx of right little finger (6/28/2017), Diarrhea, unspecified, Flatulence/gas pain/belching, Heartburn, Hemorrhoids, Hypothyroidism (6/29/2012), and Wears glasses.  Surgical History:  She  has a past surgical history that includes colonoscopy (2008); colonoscopy with biopsy (12/9/14); other surgical history (2017); other surgical history (Right, 2017); and rylan biopsy stereo nodule 1 site left (cpt=19081).   Family History:  Her family history includes Cancer in her father and maternal grandfather; Dementia in her mother; Glaucoma in her mother; Heart Attack in her paternal grandfather; Other in her mother.  Social History:  She  reports that she has never smoked. She has never used smokeless tobacco. She reports current alcohol use of about 7.0 standard drinks of alcohol per week. She reports that she does not use drugs.    Tobacco:  She has never smoked tobacco.    CAGE Alcohol Screen:   She has been screened for alcohol abuse and her score is not 0:  Cut: Have you ever felt you should Cut down on your drinking?: Yes  Annoyed: Have people Annoyed you by criticizing your drinking?: Yes  Guilty: Have you ever felt bad or Guilty about your drinking?: Yes  Eye Opener: Have you ever had a drink first thing in the  morning to steady your nerves or to get rid of a hangover (Eye opener)?: Yes  Total Score: 4      Patient Care Team:  Magan Marquis MD as PCP - General (Family Practice)  Hussein Watts MD (GASTROENTEROLOGY)  Vladimir Mccall (OBSTETRICS & GYNECOLOGY)    Review of Systems   Constitutional: Negative.  Negative for activity change, appetite change, chills and fever.   HENT: Negative.     Eyes: Negative.    Respiratory: Negative.  Negative for shortness of breath.    Cardiovascular: Negative.  Negative for chest pain and palpitations.   Gastrointestinal: Negative.  Negative for abdominal pain.   Genitourinary: Negative.  Negative for dysuria.   Musculoskeletal:  Negative for arthralgias.   Skin: Negative.  Negative for rash.   Allergic/Immunologic: Negative.    Neurological: Negative.        Objective:   Physical Exam  Vitals and nursing note reviewed.   Constitutional:       General: She is not in acute distress.     Appearance: Normal appearance.   HENT:      Head: Normocephalic and atraumatic.      Right Ear: Tympanic membrane and external ear normal.      Left Ear: Tympanic membrane and external ear normal.      Nose: Nose normal.      Mouth/Throat:      Mouth: Mucous membranes are moist.   Eyes:      Extraocular Movements: Extraocular movements intact.      Pupils: Pupils are equal, round, and reactive to light.   Cardiovascular:      Rate and Rhythm: Normal rate and regular rhythm.      Pulses: Normal pulses.           Carotid pulses are 2+ on the right side and 2+ on the left side.       Radial pulses are 2+ on the right side and 2+ on the left side.        Dorsalis pedis pulses are 2+ on the right side and 2+ on the left side.        Posterior tibial pulses are 2+ on the right side and 2+ on the left side.      Heart sounds: Normal heart sounds, S1 normal and S2 normal. No murmur heard.  Pulmonary:      Effort: Pulmonary effort is normal.      Breath sounds: Normal breath sounds.   Abdominal:      General:  Abdomen is flat. Bowel sounds are normal. There is no distension.      Palpations: Abdomen is soft.   Musculoskeletal:         General: Normal range of motion.      Cervical back: Normal range of motion and neck supple.      Right lower leg: No edema.      Left lower leg: No edema.   Skin:     General: Skin is warm and dry.      Capillary Refill: Capillary refill takes less than 2 seconds.   Neurological:      General: No focal deficit present.      Mental Status: She is alert and oriented to person, place, and time.   Psychiatric:         Mood and Affect: Mood normal.         Behavior: Behavior normal.         Thought Content: Thought content normal.         /80   Ht 5' 4.5\" (1.638 m)   Wt 137 lb (62.1 kg)   BMI 23.15 kg/m²  Estimated body mass index is 23.15 kg/m² as calculated from the following:    Height as of this encounter: 5' 4.5\" (1.638 m).    Weight as of this encounter: 137 lb (62.1 kg).    Medicare Hearing Assessment:   Hearing Screening    Time taken: 8/30/2024  7:43 AM  Screening Method: Whisper Test  Whisper Test Result: Pass         Visual Acuity:   Right Eye Visual Acuity: Corrected Right Eye Chart Acuity: 20/40   Left Eye Visual Acuity: Corrected Left Eye Chart Acuity: 20/40   Both Eyes Visual Acuity: Corrected Both Eyes Chart Acuity: 20/30   Able To Tolerate Visual Acuity: Yes        Assessment & Plan:   Tata Mcdowell is a 69 year old female who presents for a Medicare Assessment.     1. Annual physical exam (Primary)  2. White coat syndrome without diagnosis of hypertension  Overview:  Calibrated home [employer] machine 9/7/2016, -120/70's at home, and elevated at 150/80 in office  Assessment & Plan:  Last K was 4 done on 8/9/2024.  Last Cr was 0.89 done on 8/9/2024.  Last eGFR was 71 on 8/9/2024.   Orders:  -     Expanded, Low Complexity (69798)  3. Acquired hypothyroidism  Overview:  Levothyroxine 125 diagnosed age 17  Assessment & Plan:  Thyroid shows Fair control.   TSH: 4.35,  done on 8/9/2024.  FT4: 1.2, done on 4/15/2024.   Thyroid therapy includes levothyroxine 112 MCG Oral Tab [[id number]], levothyroxine 112 MCG Oral Tab [[id number]] (Long-Term Med), levothyroxine 125 MCG Oral Tab [[id number]], levothyroxine 125 MCG Oral Tab [[id number]] (Long-Term Med).   Orders:  -     Levothyroxine Sodium; Take 1 tablet (112 mcg total) by mouth every morning before breakfast.  Dispense: 90 tablet; Refill: 3  -     Expanded, Low Complexity (09381)  4. Anxiety state  Overview:  , due to dealing with mother's dementia, switched to escitalopram 5 prn 8/26/2021   Assessment & Plan:  Clinical Course: stable   Good control  Antidepressant Meds: sertraline Tabs - 50 MG  Anxiolytic Meds: ALPRAZolam Tabs - 0.5 MG   Orders:  -     ALPRAZolam; Take 1 tablet (0.5 mg total) by mouth every 4 (four) hours as needed for Anxiety.  Dispense: 40 tablet; Refill: 1  -     Sertraline HCl; Take 1 tablet (50 mg total) by mouth daily.  Dispense: 90 tablet; Refill: 3  -     Expanded, Low Complexity (57193)  5. Encounter for annual health examination    The patient indicates understanding of these issues and agrees to the plan.  Reinforced healthy diet, lifestyle, and exercise.      Return in 1 year (on 8/11/2025) for Wellness Visit, AW with chonic condition follow up.     Magan Marquis MD, 8/30/2024     Supplementary Documentation:   General Health:  In the past six months, have you lost more than 10 pounds without trying?: 2 - No  Has your appetite been poor?: No  Type of Diet: Balanced  How does the patient maintain a good energy level?: Appropriate Exercise  How would you describe your daily physical activity?: Heavy  How would you describe your current health state?: Good  How do you maintain positive mental well-being?: Social Interaction;Visiting Friends;Visiting Family  On a scale of 0 to 10, with 0 being no pain and 10 being severe pain, what is your pain level?: 0 - (None)  In the past six months, have you experienced  urine leakage?: 0-No  At any time do you feel concerned for the safety/well-being of yourself and/or your children, in your home or elsewhere?: No  Have you had any immunizations at another office such as Influenza, Hepatitis B, Tetanus, or Pneumococcal?: No    Health Maintenance   Topic Date Due    COVID-19 Vaccine (5 - 2023-24 season) 09/01/2023    Annual Depression Screening  01/01/2024    Pap Smear  04/09/2024    Annual Physical  08/10/2024    Influenza Vaccine (1) 10/01/2024    Colorectal Cancer Screening  03/02/2025    Mammogram  07/23/2025    DEXA Scan  04/15/2026    Fall Risk Screening (Annual)  Completed    Pneumococcal Vaccine: 65+ Years  Completed    Zoster Vaccines  Completed

## 2024-12-02 NOTE — PROGRESS NOTES
[facility] Neurology [address], [address]  [place] [address]  [phone]      Chief Complaint   Patient presents with    Follow-Up from Hospital       Jeronimo Oscar Ortiz Sr. is a 81 y.o. male who presents for hospital follow up for recurrent stroke.     Admit Date:     11/16/2024 DC Note date: 11/22/2024    PMH significant for CVA, HTN, recent diagnosis of lung cancer who presented to ED with AMS. Patient was diagnosed with UTI ~3 weeks ago and completed a course of antibiotics. Upon ER evaluation, lactic acid is 2.5. WBC is 13.1. Procal is 0.38. Na is 127. UA +, BC showed  gram + cocci, + Enterococcus faecalis by PCR. On 11/7, he developed sudden change in speech. Evaluated by neurology. NIH 0. CT of head showed small 14 mm area of suspect hemorrhage in the medial left cerebral hemisphere along the posterior falx.  MRI brain showed acute to subacute infarct at the junction of the left temporal and parietal lobe with small petechial hemorrhage. Multiple areas of enhancing focus concerning for metastasis and hemorrhagic mets. Repeat CT stable. TTE EF 60-65%  showed small mobile echodensities at the base of the noncoronary cusp/right coronary cusp on the ventricular aspect likely suggestive of vegetation.  Evaluated by Cardiology and ID. GYPSY showed LAD,small to medium sized mobile echodensites involving the ventricular aspect of the noncoronary cusp/right coronary cusp suggestive of vegetations. There is no evidence of abscess or  SRI thrombus. Evaluated by CT surgery, deemed not a surgical candidate.  Evaluated by ID, treated with Ampicillin and subsequently transitioned to Penicillin 20 MU via continuous 24 hr infusion and continue Ceftriaxone 2 g IV q 12 hr with plans to treat endocarditis for 6 weeks from negative cultures which was 11/15/2024.  Evaluated by therapies, discharged home with  therapies. Recommended to follow up with heme/onc as OP. He was discharged on Keppra for  Bar Briseno is a 62year old female who presents for a complete physical exam.   HPI:   Patient presents with:  Physical     Annual Physical due on 09/06/2019         Symptoms: is menopausal. Patient complains of doing well, last pap at [address] [place] [address] gene 08/21/2019 07:14 AM    ALT 17 08/21/2019 07:14 AM    BILT 1.0 08/21/2019 07:14 AM    TSH 2.70 02/18/2019 01:51 PM    T4F 1.2 08/21/2019 07:14 AM        Lab Results   Component Value Date/Time    CHOLEST 176 08/21/2019 07:14 AM    HDL 68 08/21/2019 07:14 AM REVIEW OF SYSTEMS:   A comprehensive 14 point review of systems was completed. Pertinent positives and negatives noted in the the HPI.   Specifically:  GEN:  No fever or fatigue  HEAD:  No headaches  EYES:  No vision change  EARS:  No hearing loss no wheezes. She has no rales. She exhibits no tenderness. Abdominal: Soft. Bowel sounds are normal. She exhibits no distension. There is no hepatosplenomegaly. There is no tenderness. There is no rebound and no guarding. No hernia.    Musculoskeletal: Nor present management.     Thyroid  (most recent labs)   Lab Results   Component Value Date/Time    TSH 2.70 02/18/2019 01:51 PM    T4F 1.2 08/21/2019 07:14 AM    TSHT4 6.71 (H) 08/21/2019 07:14 AM         Endocrine Medications          Levothyroxine Sodium 11

## 2025-05-01 ENCOUNTER — ANESTHESIA (OUTPATIENT)
Dept: ENDOSCOPY | Facility: HOSPITAL | Age: 70
End: 2025-05-01
Payer: MEDICARE

## 2025-05-01 ENCOUNTER — ANESTHESIA EVENT (OUTPATIENT)
Dept: ENDOSCOPY | Facility: HOSPITAL | Age: 70
End: 2025-05-01
Payer: MEDICARE

## 2025-05-01 ENCOUNTER — HOSPITAL ENCOUNTER (OUTPATIENT)
Facility: HOSPITAL | Age: 70
Setting detail: OBSERVATION
Discharge: HOME | End: 2025-05-01
Attending: STUDENT IN AN ORGANIZED HEALTH CARE EDUCATION/TRAINING PROGRAM | Admitting: STUDENT IN AN ORGANIZED HEALTH CARE EDUCATION/TRAINING PROGRAM
Payer: MEDICARE

## 2025-05-01 VITALS
HEIGHT: 65 IN | OXYGEN SATURATION: 96 % | SYSTOLIC BLOOD PRESSURE: 130 MMHG | RESPIRATION RATE: 11 BRPM | BODY MASS INDEX: 22.49 KG/M2 | WEIGHT: 135 LBS | TEMPERATURE: 99 F | DIASTOLIC BLOOD PRESSURE: 57 MMHG | HEART RATE: 63 BPM

## 2025-05-01 DIAGNOSIS — K92.0 HEMATEMESIS WITH NAUSEA: Primary | ICD-10-CM

## 2025-05-01 DIAGNOSIS — F41.1 ANXIETY STATE: ICD-10-CM

## 2025-05-01 LAB
ALBUMIN SERPL-MCNC: 4.8 G/DL (ref 3.2–4.8)
ALBUMIN/GLOB SERPL: 1.4 {RATIO} (ref 1–2)
ALP LIVER SERPL-CCNC: 82 U/L (ref 55–142)
ALT SERPL-CCNC: 20 U/L (ref 10–49)
ANION GAP SERPL CALC-SCNC: 10 MMOL/L (ref 0–18)
APTT PPP: 27.3 SECONDS (ref 23–36)
AST SERPL-CCNC: 34 U/L (ref ?–34)
BASOPHILS # BLD AUTO: 0.06 X10(3) UL (ref 0–0.2)
BASOPHILS NFR BLD AUTO: 0.8 %
BILIRUB SERPL-MCNC: 1.9 MG/DL (ref 0.2–1.1)
BUN BLD-MCNC: 12 MG/DL (ref 9–23)
CALCIUM BLD-MCNC: 10.8 MG/DL (ref 8.7–10.6)
CHLORIDE SERPL-SCNC: 104 MMOL/L (ref 98–112)
CO2 SERPL-SCNC: 24 MMOL/L (ref 21–32)
CREAT BLD-MCNC: 1.02 MG/DL (ref 0.55–1.02)
EGFRCR SERPLBLD CKD-EPI 2021: 60 ML/MIN/1.73M2 (ref 60–?)
EOSINOPHIL # BLD AUTO: 0.17 X10(3) UL (ref 0–0.7)
EOSINOPHIL NFR BLD AUTO: 2.2 %
ERYTHROCYTE [DISTWIDTH] IN BLOOD BY AUTOMATED COUNT: 12.6 %
GLOBULIN PLAS-MCNC: 3.5 G/DL (ref 2–3.5)
GLUCOSE BLD-MCNC: 105 MG/DL (ref 70–99)
HCT VFR BLD AUTO: 44.3 % (ref 35–48)
HGB BLD-MCNC: 15.1 G/DL (ref 12–16)
IMM GRANULOCYTES # BLD AUTO: 0.02 X10(3) UL (ref 0–1)
IMM GRANULOCYTES NFR BLD: 0.3 %
INR BLD: 1.07 (ref 0.8–1.2)
LYMPHOCYTES # BLD AUTO: 1.5 X10(3) UL (ref 1–4)
LYMPHOCYTES NFR BLD AUTO: 19.7 %
MCH RBC QN AUTO: 29.8 PG (ref 26–34)
MCHC RBC AUTO-ENTMCNC: 34.1 G/DL (ref 31–37)
MCV RBC AUTO: 87.5 FL (ref 80–100)
MONOCYTES # BLD AUTO: 0.53 X10(3) UL (ref 0.1–1)
MONOCYTES NFR BLD AUTO: 7 %
NEUTROPHILS # BLD AUTO: 5.34 X10 (3) UL (ref 1.5–7.7)
NEUTROPHILS # BLD AUTO: 5.34 X10(3) UL (ref 1.5–7.7)
NEUTROPHILS NFR BLD AUTO: 70 %
OSMOLALITY SERPL CALC.SUM OF ELEC: 286 MOSM/KG (ref 275–295)
PLATELET # BLD AUTO: 272 10(3)UL (ref 150–450)
POTASSIUM SERPL-SCNC: 3.6 MMOL/L (ref 3.5–5.1)
PROT SERPL-MCNC: 8.3 G/DL (ref 5.7–8.2)
PROTHROMBIN TIME: 14 SECONDS (ref 11.6–14.8)
RBC # BLD AUTO: 5.06 X10(6)UL (ref 3.8–5.3)
SODIUM SERPL-SCNC: 138 MMOL/L (ref 136–145)
WBC # BLD AUTO: 7.6 X10(3) UL (ref 4–11)

## 2025-05-01 PROCEDURE — 99236 HOSP IP/OBS SAME DATE HI 85: CPT | Performed by: STUDENT IN AN ORGANIZED HEALTH CARE EDUCATION/TRAINING PROGRAM

## 2025-05-01 RX ORDER — ONDANSETRON 2 MG/ML
4 INJECTION INTRAMUSCULAR; INTRAVENOUS ONCE
Status: COMPLETED | OUTPATIENT
Start: 2025-05-01 | End: 2025-05-01

## 2025-05-01 RX ORDER — SODIUM CHLORIDE 9 MG/ML
INJECTION, SOLUTION INTRAVENOUS CONTINUOUS
Status: DISCONTINUED | OUTPATIENT
Start: 2025-05-01 | End: 2025-05-01

## 2025-05-01 RX ORDER — ONDANSETRON 2 MG/ML
4 INJECTION INTRAMUSCULAR; INTRAVENOUS EVERY 6 HOURS PRN
Status: DISCONTINUED | OUTPATIENT
Start: 2025-05-01 | End: 2025-05-01

## 2025-05-01 RX ORDER — PANTOPRAZOLE SODIUM 40 MG/1
TABLET, DELAYED RELEASE ORAL
Qty: 30 TABLET | Refills: 0 | Status: SHIPPED | OUTPATIENT
Start: 2025-05-01

## 2025-05-01 RX ORDER — SODIUM CHLORIDE 9 MG/ML
INJECTION, SOLUTION INTRAVENOUS CONTINUOUS
Status: ACTIVE | OUTPATIENT
Start: 2025-05-01 | End: 2025-05-01

## 2025-05-01 RX ORDER — ACETAMINOPHEN 500 MG
500 TABLET ORAL EVERY 4 HOURS PRN
Status: DISCONTINUED | OUTPATIENT
Start: 2025-05-01 | End: 2025-05-01

## 2025-05-01 RX ORDER — ALPRAZOLAM 0.25 MG
0.5 TABLET ORAL EVERY 4 HOURS PRN
Status: DISCONTINUED | OUTPATIENT
Start: 2025-05-01 | End: 2025-05-01

## 2025-05-01 RX ORDER — LEVOTHYROXINE SODIUM 112 UG/1
112 TABLET ORAL
Status: DISCONTINUED | OUTPATIENT
Start: 2025-05-01 | End: 2025-05-01

## 2025-05-01 RX ORDER — ECHINACEA PURPUREA EXTRACT 125 MG
1 TABLET ORAL
Status: DISCONTINUED | OUTPATIENT
Start: 2025-05-01 | End: 2025-05-01

## 2025-05-01 RX ORDER — LEVOTHYROXINE SODIUM 125 UG/1
125 TABLET ORAL
Status: DISCONTINUED | OUTPATIENT
Start: 2025-05-01 | End: 2025-05-01

## 2025-05-01 RX ORDER — METOCLOPRAMIDE HYDROCHLORIDE 5 MG/ML
5 INJECTION INTRAMUSCULAR; INTRAVENOUS EVERY 8 HOURS PRN
Status: DISCONTINUED | OUTPATIENT
Start: 2025-05-01 | End: 2025-05-01

## 2025-05-01 RX ORDER — BENZONATATE 100 MG/1
200 CAPSULE ORAL 3 TIMES DAILY PRN
Status: DISCONTINUED | OUTPATIENT
Start: 2025-05-01 | End: 2025-05-01

## 2025-05-01 RX ORDER — LIDOCAINE HYDROCHLORIDE 10 MG/ML
INJECTION, SOLUTION EPIDURAL; INFILTRATION; INTRACAUDAL; PERINEURAL AS NEEDED
Status: DISCONTINUED | OUTPATIENT
Start: 2025-05-01 | End: 2025-05-01 | Stop reason: SURG

## 2025-05-01 RX ADMIN — LIDOCAINE HYDROCHLORIDE 25 MG: 10 INJECTION, SOLUTION EPIDURAL; INFILTRATION; INTRACAUDAL; PERINEURAL at 15:07:00

## 2025-05-01 NOTE — ED PROVIDER NOTES
Patient Seen in: [facility] Emergency Department      History     Chief Complaint   Patient presents with    GI Bleeding     Stated Complaint: Drinking prep for colonoscopy 5/1, pt complains of small amount of blood in vom*    Subjective:   HPI    Patient is a 70-year-old female coming in after experiencing multiple episodes of forceful vomiting following drinking the second half of her colonoscopy prep.  Patient was scheduled to undergo colonoscopy today for routine preventative testing.  After the patient had vomited a third time she noted small amounts of red blood streaked in the vomitus prompting the visit to the ER.  She denies any abdominal pain at this time continues to have some mild nausea there has been no blood in her rectal output, no fever, no other complaints.    Objective:   Past Medical History:    Anxiety    Closed displaced fracture of proximal phalanx of right little finger    Diarrhea, unspecified    Flatulence/gas pain/belching    Heartburn    Hemorrhoids    Hypothyroidism    Wears glasses              Past Surgical History:   Procedure Laterality Date    Colonoscopy  2008    review in 10 years    Colonoscopy      Colonoscopy with biopsy  12/09/2014    Valarie biopsy stereo nodule 1 site left (cpt=19081)      Other surgical history  2017    [facility]    Other surgical history Right 2017    ORIF right little finger                Social History     Socioeconomic History    Marital status:    Occupational History    Occupation:     Tobacco Use    Smoking status: Never    Smokeless tobacco: Never   Vaping Use    Vaping status: Never Used   Substance and Sexual Activity    Alcohol use: Yes     Alcohol/week: 5.0 standard drinks of alcohol     Types: 5 Glasses of wine per week     Comment: one glass of wine at night    Drug use: No   Other Topics Concern    Caffeine Concern Yes     Comment: green tea daily occ soda    Exercise Yes     Comment: daily    Seat Belt Yes     Self-Exams Yes     Comment: Self breast exam monthly     Social Drivers of Health      Received from [facility]    Housing Stability              Review of Systems    Positive for stated complaint: Drinking prep for colonoscopy 5/1, pt complains of small amount of blood in vom*  Other systems are as noted in HPI.  Constitutional and vital signs reviewed.      All other systems reviewed and negative except as noted above.    Physical Exam     ED Triage Vitals   BP 05/01/25 0210 (!) 162/76   Pulse 05/01/25 0209 91   Resp 05/01/25 0209 18   Temp 05/01/25 0209 97.8 °F (36.6 °C)   Temp src --    SpO2 05/01/25 0209 96 %   O2 Device 05/01/25 0209 None (Room air)       Current:/80   Pulse 73   Temp 97.8 °F (36.6 °C)   Resp 13   Ht 165.1 cm (5' 5\")   Wt 61.2 kg   SpO2 100%   BMI 22.47 kg/m²         Physical Exam    Constitutional: No apparent distress  Eyes: No scleral icterus  Heart: regular rate rhythm, no murmurs  Lungs: Clear to auscultation bilaterally  Abdomen: Soft and nontender  Skin: No rash  Neuro: Alert and oriented ×3          ED Course/ My interpretations:     Labs Reviewed   COMP METABOLIC PANEL (14) - Abnormal; Notable for the following components:       Result Value    Glucose 105 (*)     Calcium, Total 10.8 (*)     AST 34 (*)     Bilirubin, Total 1.9 (*)     Total Protein 8.3 (*)     All other components within normal limits   PROTHROMBIN TIME (PT) - Normal   PTT, ACTIVATED - Normal   CBC WITH DIFFERENTIAL WITH PLATELET             Medications given:  Orders Placed This Encounter    Comp Metabolic Panel (14)    CBC With Differential With Platelet    Prothrombin Time (PT)    PTT, Activated    ondansetron (Zofran) 4 MG/2ML injection 4 mg    sodium chloride 0.9 % IV bolus 1,000 mL                          MDM      Extensive differential diagnosis was considered for the patient including Vida-Paz tear, gastric ulcer, peptic ulcer disease, and other etiology.  I do not  suspect Boerhaave syndrome given the patient has no significant chest or abdominal discomfort and is well-appearing.  Discussed with ATILIO Petty he asked that the patient be admitted to the hospital and will consult this morning.    Hospitalist also consulted.    Patient remains hemodynamically stable with a reassuring hemoglobin.    Admission disposition: 5/1/2025  4:30 AM             Disposition and Plan     Clinical Impression:  1. Hematemesis with nausea         Disposition:  Admit  5/1/2025  4:30 am    Follow-up:  No follow-up provider specified.        Medications Prescribed:  Current Discharge Medication List          Supplementary Documentation:         Hospital Problems       Present on Admission  Date Reviewed: 2/7/2025          ICD-10-CM Noted POA    * (Principal) Hematemesis with nausea K92.0 5/1/2025 Unknown                                                          Hospital Problems       Present on Admission  Date Reviewed: 2/7/2025          ICD-10-CM Noted POA    * (Principal) Hematemesis with nausea K92.0 5/1/2025 Unknown           Documentation created with the aid of Dragon voice recognition software.  Although efforts were made to ensure the accuracy of the note, some inaccuracies may persist.

## 2025-05-01 NOTE — DISCHARGE INSTRUCTIONS
Per Dr Yen  Your colonoscopy is normal.  There are no polyps.  Repeat colonoscopy in 10 years.  Your EGD revealed a small hiatal hernia and a small ulcer in the gastro-esophageal junction, as well as acid reflux inflammation.  Hard to say how much is just from the vomiting, and how much is chronic inflammation from the hiatal hernia.  Take pantoprazole 40 mg tablet once daily at night, prior to dinner.  Continue for 30 days.  Repeat EGD in 6-8 weeks off of any reflux meds to see how much chronic inflammation persists.

## 2025-05-01 NOTE — OPERATIVE REPORT
EGD operative report  Patient Name: Tata Mcdowell  Date: 5/1/2025  Procedure: Esophagogastroduodenoscopy   Pre-Op Diagnosis: hematemesis  Post-Op Diagnosis: hiatal hernia, reflux esophagitis  Attending: Ryan Yen M.D.  Consent:  The risks, benefits, and alternatives were discussed with the patient / POA.  Risks included, but were not limited to, bleeding, perforation, medication effects, cardiac arrhythmias, and aspiration.  After all questions were answered to their satisfaction, a signed, informed, and witnessed consent was obtained.  Sedation: Monitored Anesthesia Care  Monitoring:  Pulsoximetry, pulse, respirations, and blood pressure were monitored throughout the entire procedure  Procedure: After achieving adequate sedation and placing the patient in the left lateral decubitus position, the lubricated upper endoscope was introduced into the mouth and advanced to the descending duodenum.  The endoscope was then withdrawn into the gastric antrum and placed in a retroflexed position.  The endoscope was then righted, and air was suctioned from the stomach.  The endoscope was then withdrawn from the patient, with careful visual inspection of the mucosa revealing no additional pathologic findings.  The patient tolerated the procedure without apparent procedural complications.  The patient left the procedure room in stable condition for recovery.  Findings:   Esophagus: The mucosa was normal.  Biopsies were obtained from the distal and proximal esophagus to evaluate for Eosinophilic Esophagitis.  GE junction: There is diffuse inflammation along the GE junction with 1 small ulcer without bleeding.  There are no stigmata of bleeding.  There is a 2 cm hiatal hernia.    Stomach:  The gastric body, antrum, fundus, cardia, and angularis were normal.     Duodenum: The duodenal bulb, post-bulbar duodenum, and descending duodenum were normal.    Impression: Findings as above  Recommendations:   Omeprazole 20 mg  tablet twice daily for 30 days on discharge   Repeat EGD in 1 month with Dr Yovana FITZPATRICK for discharge  General diet    Ryan Yen MD

## 2025-05-01 NOTE — ANESTHESIA POSTPROCEDURE EVALUATION
[facility]    Tata Mcdowell Patient Status:  Observation   Age/Gender 70 year old female MRN [id number]   Location [facility] END[facility]COPY [facility] Attending Manjinder Simons MD   Hosp Day # 0 PCP Magan Marquis MD       Anesthesia Post-op Note    ESOPHAGOGASTRODUODENOSCOPY (EGD), COLONOSCOPY    Procedure Summary       Date: 05/01/25 Room / Location:  ENDOSCOPY 03 / [facility] END[facility]COPY    Anesthesia Start: 1504 Anesthesia Stop: 1536    Procedures:       ESOPHAGOGASTRODUODENOSCOPY (EGD), COLONOSCOPY      COLONOSCOPY Diagnosis: (HIATAL HERNIA, ESOPHAGITIS, NORMAL COLON)    Surgeons: Ryan Yen MD Anesthesiologist: Steve Mak MD    Anesthesia Type: MAC ASA Status: 2            Anesthesia Type: MAC    Vitals Value Taken Time   /39 05/01/25 15:34   Temp  05/01/25 15:38   Pulse 85 05/01/25 15:37   Resp 14 05/01/25 15:37   SpO2 98 % 05/01/25 15:37   Vitals shown include unfiled device data.        Patient Location: Endoscopy    Anesthesia Type: MAC    Airway Patency: patent    Postop Pain Control: adequate    Mental Status: mildly sedated but able to meaningfully participate in the post-anesthesia evaluation    Nausea/Vomiting: none    Cardiopulmonary/Hydration status: stable euvolemic    Complications: no apparent anesthesia related complications    Postop vital signs: stable    Dental Exam: Unchanged from Preop    Patient to be discharged home when criteria met.

## 2025-05-01 NOTE — ED QUICK NOTES
Orders for admission, patient is aware of plan and ready to go upstairs. Any questions, please call ED RN Jordan at extension [phone].     Patient Covid vaccination status: Fully vaccinated     COVID Test Ordered in ED: None    COVID Suspicion at Admission: N/A    Running Infusions: Medication Infusions[1]     Mental Status/LOC at time of transport: A&O x 4    Other pertinent information:   CIWA score: N/A   NIH score:  N/A             [1]

## 2025-05-01 NOTE — CONSULTS
[facility]                       Gastroenterology Consultation-[facility] G[facility]    Tata Mcdowell Patient Status:  Observation    1955 MRN [id number]   Location [facility] 4NW-A Attending Manjinder Simons MD   Hosp Day # 0 PCP Magan Marquis MD     Reason for consultation: Hematemesis  HPI: Here for this is a 70-year-old female with a PMHx that includes hypothyroidism who presented to the hospital yesterday evening with hematemesis.  Patient was completing bowel prep for her scheduled outpatient routine colonoscopy when she suddenly developed severe nausea which progressed to 3 episodes of forceful vomiting with the last episode containing streaks of bright red blood.  Patient denies prior history of overt GI bleeding.  No chronic N/V.  Patient denies heartburn, dysphagia, odynophagia, change in appetite, or recent fluctuations in weight.  No further episodes of hematemesis and patient denies any episodes of melena.  She reports stools are clear and she remains n.p.o.  No new imaging; Hgb 15.1 this morning,    PMHx: Past Medical History[1]             PSHx: Past Surgical History[2]  Medications: Current Medications[3]  Allergies: Allergies[4]  Social HX: Short Social Hx on File[5]   FamHx: The patient has no family history of colon cancer or other gastrointestinal malignancies;  No family history of ulcer disease, or inflammatory bowel disease  ROS:  In addition to the pertinent positives described above:            Infectious Disease:  No chronic infections or recent fevers prior to the acute illness            Cardiovascular: No history of CAD, prior MI, chest pain, or palpitations            Respiratory: No shortness of breath, asthma, copd, recurrent pneumonia            Hematologic: The patient reports no easy bruising, frequent gum bleeding or nose bleeding;  The patient has no history of known chronic anemia            Dermatologic: The patient reports no recent rashes or  chronic skin disorders            Rheumatologic: The patient reports no history of chronic arthritis, myalgias, arthralgias            Genitourinary:  The patient reports no history of recurrent urinary tract infections, hematuria, dysuria, or nephrolithiasis           Psychiatric: The patient reports no history of depression, anxiety, suicidal ideation, or homicidal ideation           Oncologic: The patient reports no history of prior solid tumor or hematologic malignancy           ENT: The patient reports no hoarseness of voice, hearing loss, sinus congestion, tinnitus           Neurologic: The patient reports no history of seizure, stroke, or frequent headaches  PE: /90   Pulse 87   Temp 98.6 °F (37 °C) (Oral)   Resp 16   Ht 5' 5\" (1.651 m)   Wt 135 lb (61.2 kg)   SpO2 100%   BMI 22.47 kg/m²   Gen: AAO x 3, able to speak in complete sentences  HENT: EOMI, PERRL, oropharynx is clear with moist mucosal membranes  Eyes: Sclerae are anicteric  Neck:  Supple without nuchal rigidity  CV: Regular rate and rhythm, with normal S1 and S2  Resp: Clear to auscultation bilaterally without wheezes; rubs, rhonchi, or rales  Abdomen: Soft, non-tender, non-distended with the presence of bowel sounds; No hepatosplenomegaly; no rebound or guarding; No ascites is clinically apparent; no tympany to percussion  Ext: No peripheral edema or cyanosis  Skin: Warm and dry  Psychiatric: Appropriate mood and congruent affect without obvious depression or anxiety  Labs:   Lab Results   Component Value Date    WBC 7.6 05/01/2025    HGB 15.1 05/01/2025    HCT 44.3 05/01/2025    .0 05/01/2025    CREATSERUM 1.02 05/01/2025    BUN 12 05/01/2025     05/01/2025    K 3.6 05/01/2025     05/01/2025    CO2 24.0 05/01/2025     05/01/2025    CA 10.8 05/01/2025    ALB 4.8 05/01/2025    ALKPHO 82 05/01/2025    BILT 1.9 05/01/2025    AST 34 05/01/2025    ALT 20 05/01/2025    PTT 27.3 05/01/2025    INR 1.07 05/01/2025     PTP 14.0 05/01/2025     Recent Labs   Lab 05/01/25 0326   *   BUN 12   CREATSERUM 1.02   CA 10.8*      K 3.6      CO2 24.0     Recent Labs   Lab 05/01/25 0326   RBC 5.06   HGB 15.1   HCT 44.3   MCV 87.5   MCH 29.8   MCHC 34.1   RDW 12.6   NEPRELIM 5.34   WBC 7.6   .0       Recent Labs   Lab 05/01/25 0326   ALT 20   AST 34*       Impression: 70-year-old female with Hx of hypothyroidism admitted hospital yesterday evening with hematemesis which occurred while consuming bowel prep for her routine colonoscopy. No chronic GI issues-- bleeding most likely d/t Vida-Paz tear as symptoms have resolved and Hgb >15. Pt was able to complete bowel prep and her stools are clear--will plan to complete her colonoscopy and will add EGD to assess for possible ulcer, AVM, IBD, and less likely neoplasm   The risks, benefits, alternatives of the procedure including the risks of anesthesia, bleeding, perforation, missed lesions, need for surgery, and infection were discussed with the patient. She expressed understanding of the risks and was agreeable to proceed.    Recommendations:     Plan for EGD and colonoscopy  today under MAC with Dr Yen  NPO with sips of water for necessary medications   Protonix 40 mg IV BID   Continue to monitor for overt GI bleeding   Antiemetics as needed   Would anticipate discharge later today     Thank you for the consultation, we will follow the patient with you.  Attending addendum (Dr Yen) to follow later today and provide formal, final recommendations at that time   LIZETT Cruz  12:26 PM  5/1/2025  [facility]  [phone]      Physician Addendum  This patient was seen and examined independently, then discussed with HELIO Cruz.  The plan was discussed with HELIO and her note above was reviewed.  In summary, pt with scheduled colonoscopy today for history of colon polyps, but during prep, vomited mult times, saw red blood, came to  ER.  She has intermittent reflux in the past, not on treatment.  She has no history of food impaction.  She has no prior EGD.  Stable since admission, was able to get all prep down prior to vomiting.  On exam, resting comfortably in bed, non-labored breathing, soft abdomen.  Proceed with EGD and colonoscopy.    Ryan Yen MD         [1]   Past Medical History:   Anxiety    Closed displaced fracture of proximal phalanx of right little finger    Diarrhea, unspecified    Flatulence/gas pain/belching    Heartburn    Hemorrhoids    Hypothyroidism    Wears glasses   [2]   Past Surgical History:  Procedure Laterality Date    Colonoscopy  2008    review in 10 years    Colonoscopy      Colonoscopy with biopsy  2014    Valarie biopsy stereo nodule 1 site left (cpt=19081)      Other surgical history  2017    [facility]    Other surgical history Right 2017    ORIF right little finger   [3]    [COMPLETED] ondansetron (Zofran) 4 MG/2ML injection 4 mg  4 mg Intravenous Once    [COMPLETED] sodium chloride 0.9 % IV bolus 1,000 mL  1,000 mL Intravenous Once    [] sodium chloride 0.9% infusion   Intravenous Continuous    pantoprazole (Protonix) 40 mg in sodium chloride 0.9% PF 10 mL IV push  40 mg Intravenous Q12H    sodium chloride 0.9% infusion   Intravenous Continuous    acetaminophen (Tylenol Extra Strength) tab 500 mg  500 mg Oral Q4H PRN    melatonin tab 3 mg  3 mg Oral Nightly PRN    ondansetron (Zofran) 4 MG/2ML injection 4 mg  4 mg Intravenous Q6H PRN    metoclopramide (Reglan) 5 mg/mL injection 5 mg  5 mg Intravenous Q8H PRN    benzonatate (Tessalon) cap 200 mg  200 mg Oral TID PRN    glycerin-hypromellose- (Artificial Tears) 0.2-0.2-1 % ophthalmic solution 1 drop  1 drop Both Eyes QID PRN    sodium chloride (Saline Mist) 0.65 % nasal solution 1 spray  1 spray Each Nare Q3H PRN    ALPRAZolam (Xanax) tab 0.5 mg  0.5 mg Oral Q4H PRN    levothyroxine (Synthroid) tab 112 mcg  112 mcg Oral QAM AC     levothyroxine (Synthroid) tab 125 mcg  125 mcg Oral Once per day on Monday Wednesday Friday   [4] No Known Allergies  [5]   Social History  Socioeconomic History    Marital status:    Occupational History    Occupation:     Tobacco Use    Smoking status: Never    Smokeless tobacco: Never   Vaping Use    Vaping status: Never Used   Substance and Sexual Activity    Alcohol use: Yes     Alcohol/week: 5.0 standard drinks of alcohol     Types: 5 Glasses of wine per week     Comment: one glass of wine at night    Drug use: No   Other Topics Concern    Caffeine Concern Yes     Comment: green tea daily occ soda    Exercise Yes     Comment: daily    Seat Belt Yes    Self-Exams Yes     Comment: Self breast exam monthly     Social Drivers of Health     Food Insecurity: No Food Insecurity (5/1/2025)    NCSS - Food Insecurity     Worried About Running Out of Food in the Last Year: No     Ran Out of Food in the Last Year: No   Transportation Needs: No Transportation Needs (5/1/2025)    NCSS - Transportation     Lack of Transportation: No   Housing Stability: Not At Risk (5/1/2025)    NCSS - Housing/Utilities     Has Housing: Yes     Worried About Losing Housing: No     Unable to Get Utilities: No

## 2025-05-01 NOTE — PLAN OF CARE
NURSING DISCHARGE NOTE    Discharged Home via Ambulatory.  Accompanied by RN  Belongings Taken by patient/family.    Pt received A&Ox4. VSS. Afebrile. No c/o pain. No c/o N/V/D. No bloody emesis or BM. IVF infusing @100mL/h. Up ambulating in room. EGD/colonoscopy complete. See note. Cleared for discharge. PIV removed. AVS given with all questions answered. Belongings taken with patient and .

## 2025-05-01 NOTE — PLAN OF CARE
NURSING ADMISSION NOTE      Patient admitted via Cart  Oriented to room.  Safety precautions initiated.  Bed in low position.  Call light in reach.    Patient admitted to the floor. Oriented to room, has belongings. Ambulates. Call light in reach.

## 2025-05-01 NOTE — OPERATIVE REPORT
Colon operative report  Patient Name: Tata Mcdowell  Date or Service: 5/1/2025  Procedure: Colonoscopy   Pre-Op Diagnosis: history of colon polyps (normal exam in 2020 without polyps)  Post-Op Diagnosis: normal exam  Attending: Ryan Yen M.D.  Consent: The risks, benefits, and alternatives were discussed with the patient / POA.  Risks included, but were not limited to, bleeding, perforation, medication effects, cardiac arrhythmias, missed polyps, and aspiration.  After all questions were answered to their satisfaction, a signed, informed, and witnessed consent was obtained.  Sedation: Monitored Anesthesia Care  Monitoring: Pulsoximetry, pulse, respirations, and blood pressure were monitored throughout the entire procedure    Preparation Quality: adequate.  [place] Bowel Prep Score: Right 2 / Transverse 3 / Left 3   Procedure: After achieving adequate sedation, and placing the patient in the left lateral decubitus position, a digital rectal examination was performed.  The lubricated tip of the pediatric colonoscope was then introduced into the rectum and advanced to the terminal ileum.  The appendiceal orifice and ileocecal valve were clearly and distinctly visualized, thus verifying the cecum.  The terminal ileum was intubated and found to be normal to the extent examined.  The endoscope was then carefully withdrawn from the patient with careful visualization of the colonic mucosa revealing no additional pathologic findings.  Air was suctioned to the best of my ability, during withdrawal of the endoscope.  When the endoscope reached the rectum, it was placed in a retroflexed position, and the rectal bulb was thus visualized.  The endoscope was righted, and then removed from the patient.  The patient tolerated the procedure without apparent procedural complications.  The patient left the procedure room in stable condition for recovery.  Findings:    Normal terminal ileum  Normal colon and rectum  Retroflexion  performed in the cecum and rectum  Small to moderate internal hemorrhoids  Impression: Findings as above.    Recommendations:   Normal colonoscopy   Repeat exam in 10 years    Ryan Yen MD

## 2025-05-01 NOTE — ANESTHESIA PREPROCEDURE EVALUATION
PRE-OP EVALUATION    Patient Name: Tata Mcdowell    Admit Diagnosis: Hematemesis with nausea [K92.0]    Pre-op Diagnosis: hematemesis    ESOPHAGOGASTRODUODENOSCOPY (EGD), COLONOSCOPY    Anesthesia Procedure: ESOPHAGOGASTRODUODENOSCOPY (EGD), COLONOSCOPY  COLONOSCOPY    Surgeons and Role:     * Ryan Yen MD - Primary    Pre-op vitals reviewed.  Temp: 98.6 °F (37 °C)  Pulse: 87  Resp: 16  BP: 148/90  SpO2: 100 %  Body mass index is 22.47 kg/m².    Current medications reviewed.  Hospital Medications:  Current Medications[1]    Outpatient Medications:   Prescriptions Prior to Admission[2]    Allergies: Patient has no known allergies.      Anesthesia Evaluation    Patient summary reviewed.    Anesthetic Complications           GI/Hepatic/Renal    Negative GI/hepatic/renal ROS.                             Cardiovascular    Negative cardiovascular ROS.                                                   Endo/Other    Negative endo/other ROS.                              Pulmonary    Negative pulmonary ROS.                       Neuro/Psych    Negative neuro/psych ROS.                          Patient Active Problem List:     Acquired hypothyroidism     Anxiety state     White coat syndrome without diagnosis of hypertension     Personal history of colonic polyps     Other hemorrhoids     Hematemesis with nausea           Past Surgical History[3]  Social Hx on file[4]  History   Drug Use No     Available pre-op labs reviewed.  Lab Results   Component Value Date    WBC 7.6 05/01/2025    RBC 5.06 05/01/2025    HGB 15.1 05/01/2025    HCT 44.3 05/01/2025    MCV 87.5 05/01/2025    MCH 29.8 05/01/2025    MCHC 34.1 05/01/2025    RDW 12.6 05/01/2025    .0 05/01/2025     Lab Results   Component Value Date     05/01/2025    K 3.6 05/01/2025     05/01/2025    CO2 24.0 05/01/2025    BUN 12 05/01/2025    CREATSERUM 1.02 05/01/2025     (H) 05/01/2025    CA 10.8 (H) 05/01/2025     Lab Results    Component Value Date    INR 1.07 2025         Airway      Mallampati: II  Mouth opening: >3 FB  TM distance: > 6 cm  Neck ROM: full Cardiovascular    Cardiovascular exam normal.         Dental    Dentition appears grossly intact         Pulmonary    Pulmonary exam normal.                 Other findings              ASA: 2   Plan: MAC  NPO status verified and patient meets guidelines.        Comment: Plan is MAC anesthesia, which likely will include deep sedation.  Implied that memory of procedure is unlikely although intraop recall, if it occurs, may be a reasonable and comfortable experience with this anesthetic.  Aware that general anesthesia is not intended though deep sedation may include brief moments of general anesthesia.   Questions answered. Accepts. The consent was signed without further questions.     Plan/risks discussed with: patient                Present on Admission:  **None**             [1]    [COMPLETED] ondansetron (Zofran) 4 MG/2ML injection 4 mg  4 mg Intravenous Once    [COMPLETED] sodium chloride 0.9 % IV bolus 1,000 mL  1,000 mL Intravenous Once    [] sodium chloride 0.9% infusion   Intravenous Continuous    pantoprazole (Protonix) 40 mg in sodium chloride 0.9% PF 10 mL IV push  40 mg Intravenous Q12H    sodium chloride 0.9% infusion   Intravenous Continuous    acetaminophen (Tylenol Extra Strength) tab 500 mg  500 mg Oral Q4H PRN    melatonin tab 3 mg  3 mg Oral Nightly PRN    ondansetron (Zofran) 4 MG/2ML injection 4 mg  4 mg Intravenous Q6H PRN    metoclopramide (Reglan) 5 mg/mL injection 5 mg  5 mg Intravenous Q8H PRN    benzonatate (Tessalon) cap 200 mg  200 mg Oral TID PRN    glycerin-hypromellose- (Artificial Tears) 0.2-0.2-1 % ophthalmic solution 1 drop  1 drop Both Eyes QID PRN    sodium chloride (Saline Mist) 0.65 % nasal solution 1 spray  1 spray Each Nare Q3H PRN    ALPRAZolam (Xanax) tab 0.5 mg  0.5 mg Oral Q4H PRN    levothyroxine (Synthroid) tab 112 mcg   112 mcg Oral QAM AC    levothyroxine (Synthroid) tab 125 mcg  125 mcg Oral Once per day on Monday Wednesday Friday   [2]   Medications Prior to Admission   Medication Sig Dispense Refill Last Dose/Taking    Na Sulfate-K Sulfate-Mg Sulf (SUPREP BOWEL PREP KIT) 17.5-3.13-1.6 GM/177ML Oral Solution Take as directed by physician. 354 kit 0 5/1/2025 Morning    levothyroxine 112 MCG Oral Tab Take 1 tablet (112 mcg total) by mouth every morning before breakfast. 90 tablet 3 Past Week    sertraline 50 MG Oral Tab Take 1 tablet (50 mg total) by mouth daily. (Patient taking differently: Take 0.5 tablets (25 mg total) by mouth as needed.) 90 tablet 3 Past Week    levothyroxine 125 MCG Oral Tab Take 1 tablet (125 mcg total) by mouth 3 (three) times a week. 90 tablet 3 4/30/2025    ALPRAZolam 0.5 MG Oral Tab Take 1 tablet (0.5 mg total) by mouth every 4 (four) hours as needed for Anxiety. 40 tablet 1 Unknown   [3]   Past Surgical History:  Procedure Laterality Date    Colonoscopy  2008    review in 10 years    Colonoscopy      Colonoscopy with biopsy  12/09/2014    Valarie biopsy stereo nodule 1 site left (cpt=19081)      Other surgical history  2017    [facility]    Other surgical history Right 2017    ORIF right little finger   [4]   Social History  Socioeconomic History    Marital status:    Occupational History    Occupation:     Tobacco Use    Smoking status: Never    Smokeless tobacco: Never   Vaping Use    Vaping status: Never Used   Substance and Sexual Activity    Alcohol use: Yes     Alcohol/week: 5.0 standard drinks of alcohol     Types: 5 Glasses of wine per week     Comment: one glass of wine at night    Drug use: No   Other Topics Concern    Caffeine Concern Yes     Comment: green tea daily occ soda    Exercise Yes     Comment: daily    Seat Belt Yes    Self-Exams Yes     Comment: Self breast exam monthly

## 2025-05-01 NOTE — ED INITIAL ASSESSMENT (HPI)
Patient has colonoscopy scheduled today and has been prepping for it. States she vomited twice and noted streaks of bright red blood in the vomit. Patient not on blood thinners. Reports continued nausea.

## 2025-05-01 NOTE — H&P
[facility]     Tata Mcdowell Patient Status:  Emergency    1955 MRN [id number]   Location [facility] EMERGENCY DEPARTMENT Attending Mary Law MD   Hosp Day # 0 PCP Magan Marquis MD     Chief Complaint: hematemesis    Subjective:    History of Present Illness:     Tata Mcdowell is a 70 year old female with PMHx hypothyroidism/ hemorrhoids who presented to the hospital for hematemesis. She was taking her bowel prep for a colonoscopy today. She got through half the prep and then began to feel nauseous with the second bottle of prep. She was having diarrhea but denied any bloody stools. She then had 2 forceful episodes of emesis. On the third episode of emesis she had berta blood prompting he to seek medical eval. Since then she had no further emesis. She denied any abdominal pain.    History/Other:    Past Medical History:  Past Medical History[1]  Past Surgical History:   Past Surgical History[2]   Family History:   Family History[3]  Social History:    reports that she has never smoked. She has never used smokeless tobacco. She reports current alcohol use of about 5.0 standard drinks of alcohol per week. She reports that she does not use drugs.     Allergies: Allergies[4]    Medications:  Medications Ordered Prior to Encounter[5]    Review of Systems:   A comprehensive review of systems was completed.    Pertinent positives and negatives noted in the HPI.    Objective:   Physical Exam:    /74   Pulse 83   Temp 97.8 °F (36.6 °C)   Resp 22   Ht 5' 5\" (1.651 m)   Wt 135 lb (61.2 kg)   SpO2 100%   BMI 22.47 kg/m²   General: No acute distress, Alert  Respiratory: No rhonchi, no wheezes  Cardiovascular: S1, S2. Regular rate and rhythm  Abdomen: Soft, Non-tender, non-distended, positive bowel sounds  Neuro: No new focal deficits  Extremities: No edema      Results:    Labs:      Labs Last 24 Hours:    Recent Labs   Lab 25  0326   RBC 5.06   HGB  15.1   HCT 44.3   MCV 87.5   MCH 29.8   MCHC 34.1   RDW 12.6   NEPRELIM 5.34   WBC 7.6   .0       Recent Labs   Lab 05/01/25  0326   *   BUN 12   CREATSERUM 1.02   EGFRCR 60   CA 10.8*   ALB 4.8      K 3.6      CO2 24.0   ALKPHO 82   AST 34*   ALT 20   BILT 1.9*   TP 8.3*       Estimated Glomerular Filtration Rate: 60 mL/min/1.73m2 (result from lab).    Lab Results   Component Value Date    INR 1.07 05/01/2025       No results for input(s): \"TROP\", \"TROPHS\", \"CK\" in the last 168 hours.    No results for input(s): \"TROP\", \"PBNP\" in the last 168 hours.    No results for input(s): \"PCT\" in the last 168 hours.    Imaging: Imaging data reviewed in Epic.    Assessment & Plan:      #hematemesis  -hgb 15.1  -possible minor upper GIB from Vida temple tear vs esophagitis vs ulcer  -cont to monitor CBC  -NPO  -protonix 40 IV BID  -GI c/s in ED    #hyperbilirubinemia  -Tbili 1.9  -unclear etiology, no abdominal pain  -cont to trend LFTs    #hypothyroidism  -cont home levothyroxine    #anxiety  -cont home alprazolam        Plan of care discussed with ED physician    Jyoti Chase DO    Supplementary Documentation:     The 21st Century Cures Act makes medical notes like these available to patients in the interest of transparency. Please be advised this is a medical document. Medical documents are intended to carry relevant information, facts as evident, and the clinical opinion of the practitioner. The medical note is intended as peer to peer communication and may appear blunt or direct. It is written in medical language and may contain abbreviations or verbiage that are unfamiliar.                                       [1]   Past Medical History:   Anxiety    Closed displaced fracture of proximal phalanx of right little finger    Diarrhea, unspecified    Flatulence/gas pain/belching    Heartburn    Hemorrhoids    Hypothyroidism    Wears glasses   [2]   Past Surgical History:  Procedure Laterality  Date    Colonoscopy  2008    review in 10 years    Colonoscopy      Colonoscopy with biopsy  12/09/2014    Valarie biopsy stereo nodule 1 site left (cpt=19081)      Other surgical history  2017    [facility]    Other surgical history Right 2017    ORIF right little finger   [3]   Family History  Problem Relation Age of Onset    Glaucoma Mother     Dementia Mother     Other (Other) Mother     Cancer Father         liver    Heart Attack Paternal Grandfather     Cancer Maternal Grandfather         malignant brain tumor    Hypertension Maternal Grandmother     Stroke Maternal Grandmother    [4] No Known Allergies  [5]   No current facility-administered medications on file prior to encounter.     Current Outpatient Medications on File Prior to Encounter   Medication Sig Dispense Refill    Na Sulfate-K Sulfate-Mg Sulf (SUPREP BOWEL PREP KIT) 17.5-3.13-1.6 GM/177ML Oral Solution Take as directed by physician. 354 kit 0    ALPRAZolam 0.5 MG Oral Tab Take 1 tablet (0.5 mg total) by mouth every 4 (four) hours as needed for Anxiety. 40 tablet 1    levothyroxine 112 MCG Oral Tab Take 1 tablet (112 mcg total) by mouth every morning before breakfast. 90 tablet 3    sertraline 50 MG Oral Tab Take 1 tablet (50 mg total) by mouth daily. (Patient taking differently: Take 0.5 tablets (25 mg total) by mouth as needed.) 90 tablet 3    levothyroxine 125 MCG Oral Tab Take 1 tablet (125 mcg total) by mouth 3 (three) times a week. 90 tablet 3

## 2025-05-02 ENCOUNTER — PATIENT OUTREACH (OUTPATIENT)
Dept: CASE MANAGEMENT | Age: 70
End: 2025-05-02

## 2025-05-02 ENCOUNTER — TELEPHONE (OUTPATIENT)
Dept: FAMILY MEDICINE CLINIC | Facility: CLINIC | Age: 70
End: 2025-05-02

## 2025-05-02 NOTE — PROGRESS NOTES
CHARLI request (discharged 05/01)    Dr Ryan Yen  Gastroenterology  [facility]  [address] [place] [address] [phone]  Follow up 6 weeks for an EGD procedure  Transferred patient to Dr Yen's office so she can schedule her procedure  Closing encounter

## 2025-05-02 NOTE — TELEPHONE ENCOUNTER
Spoke to patient for Transitions of Care call today.  Patient does not have an appointment scheduled at this time.     FYI- Attempted to schedule Hospital follow up appointment with Dr. Marquis-- patient declined this stating she is feeling good and will follow up with Gastroenterology as instructecd. Patient states she is coming in August to see Dr. Marquis for her Medicare wellness visit so she prefers to wait until then to see him.      TCM/CHARLI appointment needed by 05/08/2025.  Please advise.    BOOK BY DATE: 05/08/2025    Clinical staff:  Patient declined follow up with Dr. Marquis at this time. Thank you!     Future Appointments   Date Time Provider Department Center   8/27/2025  7:45 AM Magan Marquis MD [facility] 3 [facility]

## 2025-05-02 NOTE — PROGRESS NOTES
Transitions of Care Navigation  Discharge Date: 25  Contact Date: 2025    Transitions of Care Assessment:  CHARLI Initial Assessment    General:  Assessment completed with: Patient  Patient Subjective: Spoke with patient who reports she is feeling much better. The patient reports symptoms have resolved.  The patient denies chest pain, abdominal pain, shortness of breath, fever, chills, nausea, vomiting, diarrhea, dizziness, lightheadedness. Patient denies melena, hematochezia, hemoptysis, hematemesis. Patient reports she started taking the Pantoprazole.  Chief Complaint: Hematemesis with nausea  Verify patient name and  with patient/ caregiver: Yes    Hospital Stay/Discharge:  Tell me what you understand of why you were in the hospital or emergency department: patient reports she vomited blood  Prior to leaving the hospital were your Discharge Instructions reviewed with you?: Yes  Did you receive a copy of your written Discharge Instructions?: Yes  What questions do you have about your Discharge Instructions?: Patient denies  Do you feel better or worse since you left the hospital or emergency department?: Better    Follow - Up Appointment:  Do you have a follow-up appointment?: No  Are there any barriers to getting to your follow-up appointment?: No    Home Health/DME:  Prior to leaving the hospital was Home Health (HH) arranged for you?: N/A  Are HH needs identified by staff during the assessment?: No     Prior to leaving the hospital or emergency department was Durable Medical Equipment (DME), medical supplies, or infusions arranged for you?: N/A  Are DME/medical supply/infusions needs identified by staff during this assessment?: No     Medications/Diet:  Did any of your medications change, during or after your hospital stay or ED visit?: Yes  Do you have your new or updated medications?: Yes  Do you understand what your medications are for and possible side effects?: Yes  Are there any reasons that keep  you from taking your medication as prescribed?: No  Any concerns about medication refills?: No    Were you given a different diet per your Discharge Instructions?: Yes  Diet Type: Limit or don't eat:  Spicy foods (pepper, chili powder, hamlin)  Hard foods (nuts, crackers, raw vegetables)  Acidic foods and drinks (tomatoes, citrus fruits and juices)  High-fat foods  Chocolate  Peppermint  Until you can swallow without pain, stick to a combined liquid and soft diet. Try foods such as cooked cereals,  mashed potatoes, and soups.  Take small bites and chew your food well.  Don't eat large meals or heavy meals at night. Don't lie down within 2 to 3 hours of eating.  Get to or stay at a healthy weight.  Stay away from alcohol, caffeine, and smoking or tobacco products.  Brush your teeth at least twice a day and floss every day.  Raise your upper body by 4 to 6 inches when lying in bed. This can be done using a foam wedge. Or put blocks or  bed risers under the legs at the head of your bed.  Reason: Esophagitis  Are there any barriers to following that diet?: No     Questions/Concerns:  Do you have any questions or concerns that have not been discussed?: No       Nursing Interventions:    Provided education on newly prescribed Pantoprazole including purpose/dosing instructions/side effects. Advised patient to limit/avoid foods that are spicy, acidic, high fat to help reduce irritation. Patient verbalized understanding.   Patient requesting assistance with scheduling Gastroenterology appointment- a message was sent to the TST and patient is aware she will be contacted directly.   Attempted to schedule Hospital follow up appointment with Dr. Marquis-- patient declined this stating she is feeling good and will follow up with Gastroenterology as instructecd. Patient states she is coming in August to see Dr. Marquis for her Medicare wellness visit so she prefers to wait until then to see him. A message was sent to the office as an  TOD.   All d/c instructions reviewed with pt.  Reviewed when to call MD vs when to go to ER/call 911.  Educated pt on the importance of taking all meds as prescribed as well as close f/u with PCP/specialists.  Pt verbalized understanding and will contact office with any further questions or concerns.       Medications:  Medication Reconciliation:  I am aware of an inpatient discharge within the last 30 days.  The discharge medication list has been reconciled with the patient's current medication list and reviewed by me. See medication list for additions of new medication, and changes to current doses of medications and discontinued medications.  Current Medications[1]      Follow-up Appointments:  Your appointments       Date & Time Appointment Department ([place])    Aug 27, 2025 7:45 AM CDT Medicare Annual Well Visit with Magan Marquis MD [facility] ([facility])              [address]  [facility] [address]  [phone]            Transitional Care Clinic  Was TCC Ordered: No    Primary Care Provider (If no TCC appointment)  Does patient already have a PCP appointment scheduled? No  Nurse Care Manager Attempted to schedule PCP office TCM/CHARLI appointment with patient   -If no appointment scheduled: Explain--   Attempted to schedule Hospital follow up appointment with Dr. Marquis-- patient declined this stating she is feeling good and will follow up with Gastroenterology. Patient states she is coming in August to see Dr. Marquis for her Medicare wellness visit so she prefers to wait until then to see him.    Specialist  Does the patient have any other follow-up appointment(s) need to be scheduled? Yes   -If yes: Nurse Care Manager reviewed upcoming specialist appointments with patient: Yes   -Does the patient need assistance scheduling appointment(s): Yes,  message to TST team    []  Patient verbally agrees to additional follow-up calls from Nurse Care Manager    Book By Date: 05/08/2025         [1]   Current Outpatient Medications   Medication Sig Dispense Refill    pantoprazole 40 MG Oral Tab EC Take one tablet (40 mg total) by mouth once daily, 30 minutes prior to breakfast. 30 tablet 0    sertraline 50 MG Oral Tab Take 0.5 tablets (25 mg total) by mouth as needed.      Na Sulfate-K Sulfate-Mg Sulf (SUPREP BOWEL PREP KIT) 17.5-3.13-1.6 GM/177ML Oral Solution Take as directed by physician. 354 kit 0    ALPRAZolam 0.5 MG Oral Tab Take 1 tablet (0.5 mg total) by mouth every 4 (four) hours as needed for Anxiety. 40 tablet 1    levothyroxine 112 MCG Oral Tab Take 1 tablet (112 mcg total) by mouth every morning before breakfast. 90 tablet 3    levothyroxine 125 MCG Oral Tab Take 1 tablet (125 mcg total) by mouth 3 (three) times a week. 90 tablet 3

## 2025-05-02 NOTE — PROGRESS NOTES
TCM has contacted patient and patient needs additional appointments.  Please contact patient for assistance with scheduling a follow-up appointment for  Gastroenterology .  Thank you!      Follow up With Specialties Details Why Contact Info   Ryan Yen MD GASTROENTEROLOGY Follow up in 6 week(s) EGD [address] [place] [address] [phone]

## 2025-05-03 NOTE — DISCHARGE SUMMARY
[facility]     Tata Mcdowell Patient Status:  Observation    1955 MRN [id number]   [facility] 4NW-A Attending No att. providers found   Hosp Day # 0 PCP Magan Marquis MD     Date of Admission:  2025  Date of Discharge:   2025    Discharge Disposition: Home or Self Care    Discharge Diagnosis:    Hematemesis/Gastritis  Hypothyroidism  Anxiety  Hyperbilirubinemia    History of Present Illness:      Tata Mcdowell is a 70 year old female with PMHx hypothyroidism/ hemorrhoids who presented to the hospital for hematemesis. She was taking her bowel prep for a colonoscopy today. She got through half the prep and then began to feel nauseous with the second bottle of prep. She was having diarrhea but denied any bloody stools. She then had 2 forceful episodes of emesis. On the third episode of emesis she had berta blood prompting he to seek medical eval. Since then she had no further emesis. She denied any abdominal pain.     Brief Synopsis:    The patient was admitted due to hematemesis.  She had EGD that showed diffuse formation around the GE junction and 1 small ulcer without bleeding.  She remained medically stable and was discharged home on PPI and plans for repeat EGD in 1 month.    Of note the patient had very mild hyperbilirubinemia which is felt to be reactive.  Discussed with the patient.  She is to follow-up with her primary care doctor for repeat blood work.    All diagnosis' and recommendations discussed with patient and/or family in detail.         59-90 High Risk  29-58 Medium Risk  0-28   Low Risk       TCM Follow-Up Recommendation:  LACE 29-58: Moderate Risk of readmission after discharge from the hospital.    Procedures during hospitalization:   EGD    Incidental or significant findings and recommendations (brief descriptions):  gastritis    Consultants:  GI    Discharge Medication List:     Discharge Medications        START taking these  medications        Instructions Prescription details   pantoprazole 40 MG Tbec  Commonly known as: Protonix      Take one tablet (40 mg total) by mouth once daily, 30 minutes prior to breakfast.   Quantity: 30 tablet  Refills: 0            CONTINUE taking these medications        Instructions Prescription details   ALPRAZolam 0.5 MG Tabs  Commonly known as: Xanax      Take 1 tablet (0.5 mg total) by mouth every 4 (four) hours as needed for Anxiety.   Quantity: 40 tablet  Refills: 1     levothyroxine 125 MCG Tabs  Commonly known as: Synthroid      Take 1 tablet (125 mcg total) by mouth 3 (three) times a week.   Quantity: 90 tablet  Refills: 3     levothyroxine 112 MCG Tabs  Commonly known as: Synthroid      Take 1 tablet (112 mcg total) by mouth every morning before breakfast.   Quantity: 90 tablet  Refills: 3     Na Sulfate-K Sulfate-Mg Sulf 17.5-3.13-1.6 GM/177ML Soln  Commonly known as: Suprep Bowel Prep Kit      Take as directed by physician.   Quantity: 354 kit  Refills: 0     sertraline 50 MG Tabs  Commonly known as: Zoloft      Take 0.5 tablets (25 mg total) by mouth as needed.   Refills: 0               Where to Get Your Medications        These medications were sent to [id number] - [place], [place] - [address] [phone], [phone] [address] [facility] [address]      Phone: [phone]   pantoprazole 40 MG Tbec         [place]    Follow-up appointment:   Ryan Yne MD  [address]   [facility] [address] [phone]    Follow up in 6 week(s)  EGD      Vital signs:       Physical Exam:    General: No acute distress   Lungs: clear to auscultation  Cardiovascular: S1, S2  Abdomen: Soft      -----------------------------------------------------------------------------------------------  PATIENT DISCHARGE INSTRUCTIONS: See electronic chart    Manjinder Simons MD    Total minutes spent on discharge plannin      The  Century Cures Act makes medical notes like  these available to patients in the interest of transparency. Please be advised this is a medical document. Medical documents are intended to carry relevant information, facts as evident, and the clinical opinion of the practitioner. The medical note is intended as peer to peer communication and may appear blunt or direct. It is written in medical language and may contain abbreviations or verbiage that are unfamiliar.

## 2025-05-17 NOTE — PROGRESS NOTES
Subjective:   Tata Mcdowell is a 70 year old female who presents for hospital follow up.   She was discharged from Inpatient hospital, [facility] to Home   Admit Date: 5/1   Discharge Date: 5/1  Hospital Discharge Diagnosis: hematemsesis    Interactive contact within 2 business days post discharge first initiated on Date: 5/2/2025    I accessed [email or web address] and/or Care Everywhere and personally reviewed the following for the recent hospitalization: provider notes, consults, summaries, labs and other test results and the pertinent findings are documented below.     HPI: 70 year old female with PMHx hypothyroidism/ hemorrhoids who presented to the hospital for hematemesis. She was taking her bowel prep for a colonoscopy today. She got through half the prep and then began to feel nauseous with the second bottle of prep. She was having diarrhea but denied any bloody stools. She then had 2 forceful episodes of emesis. On the third episode of emesis she had berta blood prompting he to seek medical eval. Since then she had no further emesis. She denied any abdominal pain.      Brief Synopsis:     The patient was admitted due to hematemesis.  She had EGD that showed diffuse formation around the GE junction and 1 small ulcer without bleeding.  She remained medically stable and was discharged home on PPI and plans for repeat EGD in 1 month.     Of note the patient had very mild hyperbilirubinemia which is felt to be reactive.  Discussed with the patient.  She is to follow-up with her primary care doctor for repeat blood work.  History of Present Illness  Ms. Nichol Mcdowell is a 70 year old female who presents with concerns following a recent colonoscopy and episodes of severe vomiting.    Three months ago, she underwent a colonoscopy with a modified preparation. During the preparation, she experienced severe vomiting at 1:30 AM, describing it as 'like the exorcist,' with the second episode including hematemesis. This caused  significant anxiety about potential internal damage.    The colonoscopy was normal but revealed a small tear and a small ulceration at the gastroesophageal junction without bleeding. She has a history of GERD and a small hiatal hernia, which may have contributed to the inflammation observed during the procedure.    Her blood pressure was initially elevated due to anxiety but later normalized to 110/59. She notes that the medication causes her to feel unwell and experience increased burping and gas.    She has a history of anxiety, which has been exacerbated by the recent medical events, including the vomiting and the findings from the colonoscopy. She monitors her blood pressure at home, which is usually lower than the readings taken during medical visits.    No heartburn or pain, but she reports occasional dyspepsia. She experiences increased burping and gas since starting pantoprazole.       History/Other:   Current Medications:  Medication Reconciliation:  I am aware of an inpatient discharge within the last 30 days.  The discharge medication list has been reconciled with the patient's current medication list and reviewed by me. See medication list for additions of new medication, and changes to current doses of medications and discontinued medications.  Active Meds, Sig Only[1]    Review of Systems:  Review of Systems   Constitutional: Negative.  Negative for fatigue, fever and unexpected weight change.   HENT: Negative.     Eyes: Negative.    Respiratory: Negative.     Cardiovascular: Negative.    Gastrointestinal: Negative.  Negative for nausea and vomiting.   Endocrine: Negative.  Negative for polydipsia, polyphagia and polyuria.   Genitourinary: Negative.    Musculoskeletal: Negative.  Negative for neck pain.   Skin: Negative.    Neurological: Negative.    Psychiatric/Behavioral: Negative.     All other systems reviewed and are negative.       Physical Exam  VITALS: BP- 138/88     Results  LABS  Calcium:  Elevated  Potassium: 3.6  Protein: Elevated    DIAGNOSTIC  Colonoscopy: Normal  Endoscopy: Esophageal mucosa normal, gastroesophageal junction with diffuse inflammation and one small ulceration without bleeding, gastric body normal, duodenum normal     Objective:   No LMP recorded. (Menstrual status: Menopause).  Estimated body mass index is 22.93 kg/m² as calculated from the following:    Height as of this encounter: 5' 5\" (1.651 m).    Weight as of this encounter: 137 lb 12.8 oz (62.5 kg).   /80   Pulse 66   Resp 14   Ht 5' 5\" (1.651 m)   Wt 137 lb 12.8 oz (62.5 kg)   SpO2 96%   BMI 22.93 kg/m²    Physical Exam  Vitals and nursing note reviewed.   Constitutional:       General: She is not in acute distress.     Appearance: Normal appearance. She is well-developed.   HENT:      Head: Normocephalic and atraumatic.   Cardiovascular:      Rate and Rhythm: Normal rate and regular rhythm.      Pulses:           Posterior tibial pulses are 2+ on the right side and 2+ on the left side.      Heart sounds: Normal heart sounds. No murmur heard.  Pulmonary:      Effort: Pulmonary effort is normal. No respiratory distress.      Breath sounds: Normal breath sounds. No wheezing.   Abdominal:      General: Bowel sounds are normal.      Palpations: Abdomen is soft.      Tenderness: There is no abdominal tenderness.   Musculoskeletal:         General: Normal range of motion.      Cervical back: Normal range of motion.      Right lower leg: No edema.      Left lower leg: No edema.   Skin:     General: Skin is warm and dry.      Findings: No rash.   Neurological:      Mental Status: She is alert and oriented to person, place, and time.   Psychiatric:         Mood and Affect: Mood normal.         Behavior: Behavior normal.         Thought Content: Thought content normal.         Judgment: Judgment normal.          Assessment & Plan  Hematemesis with nausea  Likely due to emesis from bowel prep,        Acute gastric ulcer  with hemorrhage  Better with PPI.        Elevated blood pressure reading in office with diagnosis of hypertension         Special screening for malignant neoplasm of prostate    Orders:    3D Mammogram Digital Screen, Bilateral (CPT=77067/97412); Future; Expected date: 07/23/2025    Visit for screening mammogram    Orders:    3D Mammogram Digital Screen, Bilateral (CPT=77067/42007); Future; Expected date: 07/23/2025    White coat syndrome without diagnosis of hypertension            Assessment & Plan  Gastroesophageal reflux disease with esophagitis  Recent vomiting caused esophageal tear and bleeding. Inflammation and ulceration at gastroesophageal junction, likely due to vomiting and possible hiatal hernia. No Lemus's esophagitis or cancer. Expected to heal with treatment.  - Administer pantoprazole for 30 days.  - Schedule follow-up endoscopy in 8 weeks.  - Consider alternative acid suppressant if pantoprazole intolerable.  - Use Pepcid post-treatment if needed.  - Avoid long-term acid suppressant use unless necessary.    Hiatal hernia  Small hiatal hernia may contribute to reflux and esophageal inflammation. No treatment needed unless symptomatic.  - Manage symptoms with acid suppression as needed.    Anxiety  Increased anxiety due to recent medical events. Reassured by normal findings. Elevated blood pressure likely anxiety-related.  - Monitor blood pressure at home.  - Provide reassurance regarding benign findings and follow-up plan.    General Health Maintenance  Routine health maintenance discussed.  - Order mammogram for routine screening.           Return in about 3 months (around 8/19/2025) for AWV with chonic condition follow up, as previously scheduled.          [1]   Outpatient Medications Marked as Taking for the 5/19/25 encounter (Office Visit) with Magan Marquis MD   Medication Sig    pantoprazole 40 MG Oral Tab EC Take one tablet (40 mg total) by mouth once daily, 30 minutes prior to breakfast.     sertraline 50 MG Oral Tab Take 0.5 tablets (25 mg total) by mouth as needed.    ALPRAZolam 0.5 MG Oral Tab Take 1 tablet (0.5 mg total) by mouth every 4 (four) hours as needed for Anxiety.    levothyroxine 112 MCG Oral Tab Take 1 tablet (112 mcg total) by mouth every morning before breakfast.    levothyroxine 125 MCG Oral Tab Take 1 tablet (125 mcg total) by mouth 3 (three) times a week.

## 2025-05-19 ENCOUNTER — OFFICE VISIT (OUTPATIENT)
Dept: FAMILY MEDICINE CLINIC | Facility: CLINIC | Age: 70
End: 2025-05-19
Payer: MEDICARE

## 2025-05-19 VITALS
BODY MASS INDEX: 22.96 KG/M2 | SYSTOLIC BLOOD PRESSURE: 138 MMHG | HEIGHT: 65 IN | DIASTOLIC BLOOD PRESSURE: 80 MMHG | HEART RATE: 66 BPM | RESPIRATION RATE: 14 BRPM | OXYGEN SATURATION: 96 % | WEIGHT: 137.81 LBS

## 2025-05-19 DIAGNOSIS — K25.0 ACUTE GASTRIC ULCER WITH HEMORRHAGE: ICD-10-CM

## 2025-05-19 DIAGNOSIS — Z12.31 VISIT FOR SCREENING MAMMOGRAM: ICD-10-CM

## 2025-05-19 DIAGNOSIS — R03.0 WHITE COAT SYNDROME WITHOUT DIAGNOSIS OF HYPERTENSION: ICD-10-CM

## 2025-05-19 DIAGNOSIS — I10 ELEVATED BLOOD PRESSURE READING IN OFFICE WITH DIAGNOSIS OF HYPERTENSION: ICD-10-CM

## 2025-05-19 DIAGNOSIS — K92.0 HEMATEMESIS WITH NAUSEA: Primary | ICD-10-CM

## 2025-05-19 DIAGNOSIS — Z12.5 SPECIAL SCREENING FOR MALIGNANT NEOPLASM OF PROSTATE: ICD-10-CM

## 2025-05-19 PROBLEM — K64.8 OTHER HEMORRHOIDS: Status: RESOLVED | Noted: 2020-03-02 | Resolved: 2025-05-19

## 2025-05-19 NOTE — PROGRESS NOTES
The following individual(s) verbally consented to be recorded using ambient AI listening technology and understand that they can each withdraw their consent to this listening technology at any point by asking the clinician to turn off or pause the recording:    Patient name: Tata Mcdowell

## 2025-06-29 DIAGNOSIS — E03.9 ACQUIRED HYPOTHYROIDISM: ICD-10-CM

## 2025-06-30 PROBLEM — K22.10 ULCER OF ESOPHAGUS WITHOUT BLEEDING: Status: ACTIVE | Noted: 2025-06-30

## 2025-07-01 RX ORDER — LEVOTHYROXINE SODIUM 125 UG/1
125 TABLET ORAL
Qty: 90 TABLET | Refills: 0 | Status: SHIPPED | OUTPATIENT
Start: 2025-07-02

## 2025-07-06 DIAGNOSIS — E03.9 ACQUIRED HYPOTHYROIDISM: ICD-10-CM

## 2025-07-09 RX ORDER — LEVOTHYROXINE SODIUM 125 UG/1
125 TABLET ORAL
Qty: 90 TABLET | Refills: 0 | OUTPATIENT
Start: 2025-07-09

## 2025-07-09 NOTE — TELEPHONE ENCOUNTER
3 month supply of refills was sent on 07/02/2025:    Outpatient Medication Detail     Disp Refills Start End    levothyroxine 125 MCG Oral Tab 90 tablet 0 7/2/2025 --    Sig - Route: Take 1 tablet (125 mcg total) by mouth 3 (three) times a week. - Oral    Sent to pharmacy as: Levothyroxine Sodium 125 MCG Oral Tablet (Synthroid)    E-Prescribing Status: Receipt confirmed by pharmacy (7/1/2025  4:39 PM CDT)      Associated Diagnoses    Acquired hypothyroidism        Ph[facility]    [id number] [place] [facility] [address] [phone], [phone]

## 2025-07-24 ENCOUNTER — HOSPITAL ENCOUNTER (OUTPATIENT)
Dept: MAMMOGRAPHY | Age: 70
Discharge: HOME | End: 2025-07-24
Attending: FAMILY MEDICINE
Payer: MEDICARE

## 2025-07-24 DIAGNOSIS — Z12.31 VISIT FOR SCREENING MAMMOGRAM: ICD-10-CM

## 2025-07-24 DIAGNOSIS — Z12.5 SPECIAL SCREENING FOR MALIGNANT NEOPLASM OF PROSTATE: ICD-10-CM

## 2025-07-24 PROCEDURE — 77063 BREAST TOMOSYNTHESIS BI: CPT | Performed by: FAMILY MEDICINE

## 2025-07-24 PROCEDURE — 77067 SCR MAMMO BI INCL CAD: CPT | Performed by: FAMILY MEDICINE

## 2025-08-25 PROBLEM — K92.0 HEMATEMESIS WITH NAUSEA: Status: RESOLVED | Noted: 2025-05-01 | Resolved: 2025-08-25

## 2025-08-27 ENCOUNTER — OFFICE VISIT (OUTPATIENT)
Dept: FAMILY MEDICINE CLINIC | Facility: CLINIC | Age: 70
End: 2025-08-27

## 2025-08-27 VITALS
OXYGEN SATURATION: 97 % | RESPIRATION RATE: 14 BRPM | DIASTOLIC BLOOD PRESSURE: 68 MMHG | SYSTOLIC BLOOD PRESSURE: 136 MMHG | HEIGHT: 65 IN | BODY MASS INDEX: 22.63 KG/M2 | WEIGHT: 135.81 LBS | HEART RATE: 76 BPM

## 2025-08-27 DIAGNOSIS — Z00.00 ANNUAL PHYSICAL EXAM: Primary | ICD-10-CM

## 2025-08-27 DIAGNOSIS — Z87.19 HISTORY OF ESOPHAGEAL ULCER: ICD-10-CM

## 2025-08-27 DIAGNOSIS — R03.0 WHITE COAT SYNDROME WITHOUT DIAGNOSIS OF HYPERTENSION: ICD-10-CM

## 2025-08-27 DIAGNOSIS — F41.1 ANXIETY STATE: ICD-10-CM

## 2025-08-27 DIAGNOSIS — E03.9 ACQUIRED HYPOTHYROIDISM: ICD-10-CM

## 2025-08-27 DIAGNOSIS — Z00.00 ENCOUNTER FOR ANNUAL HEALTH EXAMINATION: ICD-10-CM

## 2025-08-27 PROBLEM — K21.00 GASTROESOPHAGEAL REFLUX DISEASE WITH ESOPHAGITIS WITHOUT HEMORRHAGE: Status: ACTIVE | Noted: 2025-08-27

## 2025-08-27 PROCEDURE — G0439 PPPS, SUBSEQ VISIT: HCPCS | Performed by: FAMILY MEDICINE

## 2025-08-27 PROCEDURE — 99213 OFFICE O/P EST LOW 20 MIN: CPT | Performed by: FAMILY MEDICINE

## 2025-08-27 PROCEDURE — G2211 COMPLEX E/M VISIT ADD ON: HCPCS | Performed by: FAMILY MEDICINE

## 2025-08-27 RX ORDER — ALPRAZOLAM 0.5 MG
0.5 TABLET ORAL EVERY 4 HOURS PRN
Qty: 35 TABLET | Refills: 1 | Status: SHIPPED | OUTPATIENT
Start: 2025-08-27

## (undated) DIAGNOSIS — Z12.31 ENCOUNTER FOR SCREENING MAMMOGRAM FOR MALIGNANT NEOPLASM OF BREAST: Primary | ICD-10-CM

## (undated) DIAGNOSIS — F41.1 ANXIETY STATE: ICD-10-CM

## (undated) DIAGNOSIS — R10.9 STOMACH DISCOMFORT: Primary | ICD-10-CM

## (undated) DEVICE — 3M™ RED DOT™ MONITORING ELECTRODE WITH FOAM TAPE AND STICKY GEL, 50/BAG, 20/CASE, 72/PLT 2570: Brand: RED DOT™

## (undated) DEVICE — 10FT COMBINED O2 DELIVERY/CO2 MONITORING. FILTER WITH MICROSTREAM TYPE LUER: Brand: DUAL ADULT NASAL CANNULA

## (undated) DEVICE — 1200CC GUARDIAN II: Brand: GUARDIAN

## (undated) DEVICE — V2 SPECIMEN COLLECTION MANIFOLD KIT: Brand: NEPTUNE

## (undated) DEVICE — KIT VLV 5 PC AIR H2O SUCT BX ENDOGATOR CONN

## (undated) DEVICE — BITEBLOCK ENDOSCP 60FR MAXI STRP

## (undated) DEVICE — KIT CUSTOM ENDOPROCEDURE STERIS

## (undated) NOTE — LETTER
[address]  [id number]  Authorization for Surgical Operation and Procedure     Date:___________                                                                                                         Time:__________  I hereby authorize Surgeon(s):  Ryan Yen MD, my physician and his/her assistants (if applicable), which may include medical students, residents, and/or fellows, to perform the following surgical operation/ procedure and administer such anesthesia as may be determined necessary by my physician:  Operation/Procedure name (s) Procedure(s):  ESOPHAGOGASTRODUODENOSCOPY (EGD), COLONOSCOPY  COLONOSCOPY on [facility]   2.   I recognize that during the surgical operation/procedure, unforeseen conditions may necessitate additional or different procedures than those listed above.  I, therefore, further authorize and request that the above-named surgeon, assistants, or designees perform such procedures as are, in their judgment, necessary and desirable.    3.   My surgeon/physician has discussed prior to my surgery the potential benefits, risks and side effects of this procedure; the likelihood of achieving goals; and potential problems that might occur during recuperation.  They also discussed reasonable alternatives to the procedure, including risks, benefits, and side effects related to the alternatives and risks related to not receiving this procedure.  I have had all my questions answered and I acknowledge that no guarantee has been made as to the result that may be obtained.    4.   Should the need arise during my operation/procedure, which includes change of level of care prior to discharge, I also consent to the administration of blood and/or blood products.  Further, I understand that despite careful testing and screening of blood or blood products by collecting agencies, I may still be subject to ill effects as a result of receiving a blood  transfusion and/or blood products.  The following are some, but not all, of the potential risks that can occur: fever and allergic reactions, hemolytic reactions, transmission of diseases such as Hepatitis, AIDS and Cytomegalovirus (CMV) and fluid overload.  In the event that I wish to have an autologous transfusion of my own blood, or a directed donor transfusion, I will discuss this with my physician.  Check only if Refusing Blood or Blood Products  I understand refusal of blood or blood products as deemed necessary by my physician may have serious consequences to my condition to include possible death. I hereby assume responsibility for my refusal and release the hospital, its personnel, and my physicians from any responsibility for the consequences of my refusal.          o  Refuse      5.   I authorize the use of any specimen, organs, tissues, body parts or foreign objects that may be removed from my body during the operation/procedure for diagnosis, research or teaching purposes and their subsequent disposal by hospital authorities.  I also authorize the release of specimen test results and/or written reports to my treating physician on the hospital medical staff or other referring or consulting physicians involved in my care, at the discretion of the Pathologist or my treating physician.    6.   I consent to the photographing or videotaping of the operations or procedures to be performed, including appropriate portions of my body for medical, scientific, or educational purposes, provided my identity is not revealed by the pictures or by descriptive texts accompanying them.  If the procedure has been photographed/videotaped, the surgeon will obtain the original picture, image, videotape or CD.  The hospital will not be responsible for storage, release or maintenance of the picture, image, tape or CD.    7.   I consent to the presence of a  or observers in the operating room as deemed  necessary by my physician or their designees.    8.   I recognize that in the event my procedure results in extended X-Ray/fluoroscopy time, I may develop a skin reaction.    9. If I have a Do Not Attempt Resuscitation (DNAR) order in place, that status will be suspended while in the operating room, procedural suite, and during the recovery period unless otherwise explicitly stated by me (or a person authorized to consent on my behalf). The surgeon or my attending physician will determine when the applicable recovery period ends for purposes of reinstating the DNAR order.  10. Patients having a sterilization procedure: I understand that if the procedure is successful the results will be permanent and it will therefore be impossible for me to inseminate, conceive, or bear children.  I also understand that the procedure is intended to result in sterility, although the result has not been guaranteed.   11. I acknowledge that my physician has explained sedation/analgesia administration to me including the risk and benefits I consent to the administration of sedation/analgesia as may be necessary or desirable in the judgment of my physician.    I CERTIFY THAT I HAVE READ AND FULLY UNDERSTAND THE ABOVE CONSENT TO OPERATION and/or OTHER PROCEDURE.    _________________________________________  __________________________________  Signature of Patient     Signature of Responsible Person         ___________________________________         Printed Name of Responsible Person           _________________________________                 Relationship to Patient  _________________________________________  ______________________________  Signature of Witness          Date  Time      Patient Name: Tata Mcdowell     : 1955                 Printed: May 1, 2025     Medical Record #: [id number]                     Page 1 of 2                                    [address]   [id number]    Consent for Anesthesia    Tata CROSS agree to be cared for by an anesthesiologist, who is specially trained to monitor me and give me medicine to put me to sleep or keep me comfortable during my procedure    I understand that my anesthesiologist is not an employee or agent of [facility] or [employer]. He or she works for [employer].    As the patient asking for anesthesia services, I agree to:  Allow the anesthesiologist (anesthesia doctor) to give me medicine and do additional procedures as necessary. Some examples are: Starting or using an “IV” to give me medicine, fluids or blood during my procedure, and having a breathing tube placed to help me breathe when I’m asleep (intubation). In the event that my heart stops working properly, I understand that my anesthesiologist will make every effort to sustain my life, unless otherwise directed by [facility] Do Not Resuscitate documents.  Tell my anesthesia doctor before my procedure:  If I am pregnant.  The last time that I ate or drank.  All of the medicines I take (including prescriptions, herbal supplements, and pills I can buy without a prescription (including street drugs/illegal medications). Failure to inform my anesthesiologist about these medicines may increase my risk of anesthetic complications.  If I am allergic to anything or have had a reaction to anesthesia before.  I understand how the anesthesia medicine will help me (benefits).  I understand that with any type of anesthesia medicine there are risks:  The most common risks are: nausea, vomiting, sore throat, muscle soreness, damage to my eyes, mouth, or teeth (from breathing tube placement).  Rare risks include: remembering what happened during my procedure, allergic reactions to medications, injury to my airway, heart, lungs, vision, nerves, or muscles and in extremely rare instances death.  My doctor has explained to me other choices  available to me for my care (alternatives).  Pregnant Patients (“epidural”):  I understand that the risks of having an epidural (medicine given into my back to help control pain during labor), include itching, low blood pressure, difficulty urinating, headache or slowing of the baby’s heart. Very rare risks include infection, bleeding, seizure, irregular heart rhythms and nerve injury.  Regional Anesthesia (“spinal”, “epidural”, & “nerve blocks”):  I understand that rare but potential complications include headache, bleeding, infection, seizure, irregular heart rhythms, and nerve injury.    I can change my mind about having anesthesia services at any time before I get the medicine.    _____________________________________________________________________________  Patient (or Representative) Signature/Relationship to Patient  Date   Time    _____________________________________________________________________________   Name (if used)    Language/Organization   Time    _____________________________________________________________________________  Anesthesiologist Signature     Date   Time  I have discussed the procedure and information above with the patient (or patient’s representative) and answered their questions. The patient or their representative has agreed to have anesthesia services.    _____________________________________________________________________________  Witness        Date   Time  I have verified that the signature is that of the patient or patient’s representative, and that it was signed before the procedure  Patient Name: Tata Mcdowell     : 1955                 Printed: May 1, 2025     Medical Record #: [id number]                     Page 2 of 2

## (undated) NOTE — LETTER
[address]  [address]  Authorization for Surgical Operation and Procedure     Date:___________                                                                                                         Time:__________  I hereby authorize Surgeon(s):  Ryan Yen MD, my physician and his/her assistants (if applicable), which may include medical students, residents, and/or fellows, to perform the following surgical operation/ procedure and administer such anesthesia as may be determined necessary by my physician:  Operation/Procedure name (s) Procedure(s):  ESOPHAGOGASTRODUODENOSCOPY (EGD), COLONOSCOPY  COLONOSCOPY on [facility]   2.   I recognize that during the surgical operation/procedure, unforeseen conditions may necessitate additional or different procedures than those listed above.  I, therefore, further authorize and request that the above-named surgeon, assistants, or designees perform such procedures as are, in their judgment, necessary and desirable.    3.   My surgeon/physician has discussed prior to my surgery the potential benefits, risks and side effects of this procedure; the likelihood of achieving goals; and potential problems that might occur during recuperation.  They also discussed reasonable alternatives to the procedure, including risks, benefits, and side effects related to the alternatives and risks related to not receiving this procedure.  I have had all my questions answered and I acknowledge that no guarantee has been made as to the result that may be obtained.    4.   Should the need arise during my operation/procedure, which includes change of level of care prior to discharge, I also consent to the administration of blood and/or blood products.  Further, I understand that despite careful testing and screening of blood or blood products by collecting agencies, I may still be subject to ill effects as a result of receiving a blood  transfusion and/or blood products.  The following are some, but not all, of the potential risks that can occur: fever and allergic reactions, hemolytic reactions, transmission of diseases such as Hepatitis, AIDS and Cytomegalovirus (CMV) and fluid overload.  In the event that I wish to have an autologous transfusion of my own blood, or a directed donor transfusion, I will discuss this with my physician.  Check only if Refusing Blood or Blood Products  I understand refusal of blood or blood products as deemed necessary by my physician may have serious consequences to my condition to include possible death. I hereby assume responsibility for my refusal and release the hospital, its personnel, and my physicians from any responsibility for the consequences of my refusal.          o  Refuse      5.   I authorize the use of any specimen, organs, tissues, body parts or foreign objects that may be removed from my body during the operation/procedure for diagnosis, research or teaching purposes and their subsequent disposal by hospital authorities.  I also authorize the release of specimen test results and/or written reports to my treating physician on the hospital medical staff or other referring or consulting physicians involved in my care, at the discretion of the Pathologist or my treating physician.    6.   I consent to the photographing or videotaping of the operations or procedures to be performed, including appropriate portions of my body for medical, scientific, or educational purposes, provided my identity is not revealed by the pictures or by descriptive texts accompanying them.  If the procedure has been photographed/videotaped, the surgeon will obtain the original picture, image, videotape or CD.  The hospital will not be responsible for storage, release or maintenance of the picture, image, tape or CD.    7.   I consent to the presence of a  or observers in the operating room as deemed  necessary by my physician or their designees.    8.   I recognize that in the event my procedure results in extended X-Ray/fluoroscopy time, I may develop a skin reaction.    9. If I have a Do Not Attempt Resuscitation (DNAR) order in place, that status will be suspended while in the operating room, procedural suite, and during the recovery period unless otherwise explicitly stated by me (or a person authorized to consent on my behalf). The surgeon or my attending physician will determine when the applicable recovery period ends for purposes of reinstating the DNAR order.  10. Patients having a sterilization procedure: I understand that if the procedure is successful the results will be permanent and it will therefore be impossible for me to inseminate, conceive, or bear children.  I also understand that the procedure is intended to result in sterility, although the result has not been guaranteed.   11. I acknowledge that my physician has explained sedation/analgesia administration to me including the risk and benefits I consent to the administration of sedation/analgesia as may be necessary or desirable in the judgment of my physician.    I CERTIFY THAT I HAVE READ AND FULLY UNDERSTAND THE ABOVE CONSENT TO OPERATION and/or OTHER PROCEDURE.    _________________________________________  __________________________________  Signature of Patient     Signature of Responsible Person         ___________________________________         Printed Name of Responsible Person           _________________________________                 Relationship to Patient  _________________________________________  ______________________________  Signature of Witness          Date  Time      Patient Name: Tata Mcdowell     : 1955                 Printed: May 1, 2025     Medical Record #: [id number]                     Page 1 of 2                                    [address]   [id number]    Consent for Anesthesia    Tata CROSS agree to be cared for by an anesthesiologist, who is specially trained to monitor me and give me medicine to put me to sleep or keep me comfortable during my procedure    I understand that my anesthesiologist is not an employee or agent of [facility] or [employer]. He or she works for [employer].    As the patient asking for anesthesia services, I agree to:  Allow the anesthesiologist (anesthesia doctor) to give me medicine and do additional procedures as necessary. Some examples are: Starting or using an “IV” to give me medicine, fluids or blood during my procedure, and having a breathing tube placed to help me breathe when I’m asleep (intubation). In the event that my heart stops working properly, I understand that my anesthesiologist will make every effort to sustain my life, unless otherwise directed by [facility] Do Not Resuscitate documents.  Tell my anesthesia doctor before my procedure:  If I am pregnant.  The last time that I ate or drank.  All of the medicines I take (including prescriptions, herbal supplements, and pills I can buy without a prescription (including street drugs/illegal medications). Failure to inform my anesthesiologist about these medicines may increase my risk of anesthetic complications.  If I am allergic to anything or have had a reaction to anesthesia before.  I understand how the anesthesia medicine will help me (benefits).  I understand that with any type of anesthesia medicine there are risks:  The most common risks are: nausea, vomiting, sore throat, muscle soreness, damage to my eyes, mouth, or teeth (from breathing tube placement).  Rare risks include: remembering what happened during my procedure, allergic reactions to medications, injury to my airway, heart, lungs, vision, nerves, or muscles and in extremely rare instances death.  My doctor has explained to me other choices  available to me for my care (alternatives).  Pregnant Patients (“epidural”):  I understand that the risks of having an epidural (medicine given into my back to help control pain during labor), include itching, low blood pressure, difficulty urinating, headache or slowing of the baby’s heart. Very rare risks include infection, bleeding, seizure, irregular heart rhythms and nerve injury.  Regional Anesthesia (“spinal”, “epidural”, & “nerve blocks”):  I understand that rare but potential complications include headache, bleeding, infection, seizure, irregular heart rhythms, and nerve injury.    I can change my mind about having anesthesia services at any time before I get the medicine.    _____________________________________________________________________________  Patient (or Representative) Signature/Relationship to Patient  Date   Time    _____________________________________________________________________________   Name (if used)    Language/Organization   Time    _____________________________________________________________________________  Anesthesiologist Signature     Date   Time  I have discussed the procedure and information above with the patient (or patient’s representative) and answered their questions. The patient or their representative has agreed to have anesthesia services.    _____________________________________________________________________________  Witness        Date   Time  I have verified that the signature is that of the patient or patient’s representative, and that it was signed before the procedure  Patient Name: Tata Mcdowell     : 1955                 Printed: May 1, 2025     Medical Record #: [id number]                     Page 2 of 2

## (undated) NOTE — MR AVS SNAPSHOT
[facility]  [place],  [address] [place] [address] [id number]               Thank you for choosing us for your health care visit with Antoine Daniels MD.  We are glad to serve you and happy to provide you with this summa taking this medication, and follow the directions you see here. Commonly known as:  SYNTHROID, LEVOTHROID           MULTIPLE VITAMINS-MINERALS OR   Take 1 Tab by mouth daily.            Sertraline HCl 50 MG Tabs   TAKE ONE TABLET BY MOUTH DAILY   Commonly

## (undated) NOTE — Clinical Note
Initial assessment completed with patient. Patient declined scheduling follow up appointment with you at this time-- she states she will follow up with Gastroenterology as instructed. A message was sent to the office as an FYI/per CHARLI protocol.  Patient met goals/ No further outreach needed.   Thank you!

## (undated) NOTE — LETTER
PATIENT NAME: Alia Boyle   : 1955       Waiver      DATE: 2018     Dear Patient,    Thank you for choosing [facility] as your health care provider. Your physician has deemed the following medical service(s) necessary.   Mounika Moser

## (undated) NOTE — LETTER
[address]  [address]  Authorization for Surgical Operation and Procedure     Date:___________                                                                                                         Time:__________  I hereby authorize Surgeon(s):  Ryan Yen MD, my physician and his/her assistants (if applicable), which may include medical students, residents, and/or fellows, to perform the following surgical operation/ procedure and administer such anesthesia as may be determined necessary by my physician:  Operation/Procedure name (s) Procedure(s):  ESOPHAGOGASTRODUODENOSCOPY (EGD), COLONOSCOPY  COLONOSCOPY on [facility]   2.   I recognize that during the surgical operation/procedure, unforeseen conditions may necessitate additional or different procedures than those listed above.  I, therefore, further authorize and request that the above-named surgeon, assistants, or designees perform such procedures as are, in their judgment, necessary and desirable.    3.   My surgeon/physician has discussed prior to my surgery the potential benefits, risks and side effects of this procedure; the likelihood of achieving goals; and potential problems that might occur during recuperation.  They also discussed reasonable alternatives to the procedure, including risks, benefits, and side effects related to the alternatives and risks related to not receiving this procedure.  I have had all my questions answered and I acknowledge that no guarantee has been made as to the result that may be obtained.    4.   Should the need arise during my operation/procedure, which includes change of level of care prior to discharge, I also consent to the administration of blood and/or blood products.  Further, I understand that despite careful testing and screening of blood or blood products by collecting agencies, I may still be subject to ill effects as a result of receiving a blood  transfusion and/or blood products.  The following are some, but not all, of the potential risks that can occur: fever and allergic reactions, hemolytic reactions, transmission of diseases such as Hepatitis, AIDS and Cytomegalovirus (CMV) and fluid overload.  In the event that I wish to have an autologous transfusion of my own blood, or a directed donor transfusion, I will discuss this with my physician.  Check only if Refusing Blood or Blood Products  I understand refusal of blood or blood products as deemed necessary by my physician may have serious consequences to my condition to include possible death. I hereby assume responsibility for my refusal and release the hospital, its personnel, and my physicians from any responsibility for the consequences of my refusal.          o  Refuse      5.   I authorize the use of any specimen, organs, tissues, body parts or foreign objects that may be removed from my body during the operation/procedure for diagnosis, research or teaching purposes and their subsequent disposal by hospital authorities.  I also authorize the release of specimen test results and/or written reports to my treating physician on the hospital medical staff or other referring or consulting physicians involved in my care, at the discretion of the Pathologist or my treating physician.    6.   I consent to the photographing or videotaping of the operations or procedures to be performed, including appropriate portions of my body for medical, scientific, or educational purposes, provided my identity is not revealed by the pictures or by descriptive texts accompanying them.  If the procedure has been photographed/videotaped, the surgeon will obtain the original picture, image, videotape or CD.  The hospital will not be responsible for storage, release or maintenance of the picture, image, tape or CD.    7.   I consent to the presence of a  or observers in the operating room as deemed  necessary by my physician or their designees.    8.   I recognize that in the event my procedure results in extended X-Ray/fluoroscopy time, I may develop a skin reaction.    9. If I have a Do Not Attempt Resuscitation (DNAR) order in place, that status will be suspended while in the operating room, procedural suite, and during the recovery period unless otherwise explicitly stated by me (or a person authorized to consent on my behalf). The surgeon or my attending physician will determine when the applicable recovery period ends for purposes of reinstating the DNAR order.  10. Patients having a sterilization procedure: I understand that if the procedure is successful the results will be permanent and it will therefore be impossible for me to inseminate, conceive, or bear children.  I also understand that the procedure is intended to result in sterility, although the result has not been guaranteed.   11. I acknowledge that my physician has explained sedation/analgesia administration to me including the risk and benefits I consent to the administration of sedation/analgesia as may be necessary or desirable in the judgment of my physician.    I CERTIFY THAT I HAVE READ AND FULLY UNDERSTAND THE ABOVE CONSENT TO OPERATION and/or OTHER PROCEDURE.    _________________________________________  __________________________________  Signature of Patient     Signature of Responsible Person         ___________________________________         Printed Name of Responsible Person           _________________________________                 Relationship to Patient  _________________________________________  ______________________________  Signature of Witness          Date  Time      Patient Name: Tata Mcdowell     : 1955                 Printed: May 1, 2025     Medical Record #: [id number]                     Page 1 of 2                                    [address]   [id number]    Consent for Anesthesia    Tata CROSS agree to be cared for by an anesthesiologist, who is specially trained to monitor me and give me medicine to put me to sleep or keep me comfortable during my procedure    I understand that my anesthesiologist is not an employee or agent of [facility] or [employer]. He or she works for [employer].    As the patient asking for anesthesia services, I agree to:  Allow the anesthesiologist (anesthesia doctor) to give me medicine and do additional procedures as necessary. Some examples are: Starting or using an “IV” to give me medicine, fluids or blood during my procedure, and having a breathing tube placed to help me breathe when I’m asleep (intubation). In the event that my heart stops working properly, I understand that my anesthesiologist will make every effort to sustain my life, unless otherwise directed by [facility] Do Not Resuscitate documents.  Tell my anesthesia doctor before my procedure:  If I am pregnant.  The last time that I ate or drank.  All of the medicines I take (including prescriptions, herbal supplements, and pills I can buy without a prescription (including street drugs/illegal medications). Failure to inform my anesthesiologist about these medicines may increase my risk of anesthetic complications.  If I am allergic to anything or have had a reaction to anesthesia before.  I understand how the anesthesia medicine will help me (benefits).  I understand that with any type of anesthesia medicine there are risks:  The most common risks are: nausea, vomiting, sore throat, muscle soreness, damage to my eyes, mouth, or teeth (from breathing tube placement).  Rare risks include: remembering what happened during my procedure, allergic reactions to medications, injury to my airway, heart, lungs, vision, nerves, or muscles and in extremely rare instances death.  My doctor has explained to me other choices  available to me for my care (alternatives).  Pregnant Patients (“epidural”):  I understand that the risks of having an epidural (medicine given into my back to help control pain during labor), include itching, low blood pressure, difficulty urinating, headache or slowing of the baby’s heart. Very rare risks include infection, bleeding, seizure, irregular heart rhythms and nerve injury.  Regional Anesthesia (“spinal”, “epidural”, & “nerve blocks”):  I understand that rare but potential complications include headache, bleeding, infection, seizure, irregular heart rhythms, and nerve injury.    I can change my mind about having anesthesia services at any time before I get the medicine.    _____________________________________________________________________________  Patient (or Representative) Signature/Relationship to Patient  Date   Time    _____________________________________________________________________________   Name (if used)    Language/Organization   Time    _____________________________________________________________________________  Anesthesiologist Signature     Date   Time  I have discussed the procedure and information above with the patient (or patient’s representative) and answered their questions. The patient or their representative has agreed to have anesthesia services.    _____________________________________________________________________________  Witness        Date   Time  I have verified that the signature is that of the patient or patient’s representative, and that it was signed before the procedure  Patient Name: Tata Mcdowell     : 1955                 Printed: May 1, 2025     Medical Record #: [id number]                     Page 2 of 2